# Patient Record
Sex: MALE | Race: OTHER | Employment: OTHER | URBAN - METROPOLITAN AREA
[De-identification: names, ages, dates, MRNs, and addresses within clinical notes are randomized per-mention and may not be internally consistent; named-entity substitution may affect disease eponyms.]

---

## 2017-01-20 ENCOUNTER — NURSE ONLY (OUTPATIENT)
Dept: FAMILY MEDICINE CLINIC | Age: 42
End: 2017-01-20

## 2017-01-20 DIAGNOSIS — Z23 NEED FOR PROPHYLACTIC VACCINATION AND INOCULATION AGAINST VIRAL HEPATITIS: Primary | ICD-10-CM

## 2017-01-20 PROCEDURE — 90471 IMMUNIZATION ADMIN: CPT | Performed by: FAMILY MEDICINE

## 2017-01-20 PROCEDURE — 90746 HEPB VACCINE 3 DOSE ADULT IM: CPT | Performed by: FAMILY MEDICINE

## 2017-03-10 DIAGNOSIS — D47.2 SMOLDERING MULTIPLE MYELOMA: Primary | ICD-10-CM

## 2017-03-15 ENCOUNTER — OFFICE VISIT (OUTPATIENT)
Dept: ONCOLOGY | Age: 42
End: 2017-03-15

## 2017-03-15 VITALS
RESPIRATION RATE: 18 BRPM | TEMPERATURE: 97.1 F | HEIGHT: 63 IN | OXYGEN SATURATION: 98 % | WEIGHT: 166 LBS | BODY MASS INDEX: 29.41 KG/M2 | SYSTOLIC BLOOD PRESSURE: 130 MMHG | DIASTOLIC BLOOD PRESSURE: 81 MMHG | HEART RATE: 71 BPM

## 2017-03-15 DIAGNOSIS — D47.2 SMOLDERING MULTIPLE MYELOMA: Primary | ICD-10-CM

## 2017-03-15 PROCEDURE — G8484 FLU IMMUNIZE NO ADMIN: HCPCS | Performed by: INTERNAL MEDICINE

## 2017-03-15 PROCEDURE — 1036F TOBACCO NON-USER: CPT | Performed by: INTERNAL MEDICINE

## 2017-03-15 PROCEDURE — G8427 DOCREV CUR MEDS BY ELIG CLIN: HCPCS | Performed by: INTERNAL MEDICINE

## 2017-03-15 PROCEDURE — 99214 OFFICE O/P EST MOD 30 MIN: CPT | Performed by: INTERNAL MEDICINE

## 2017-03-15 PROCEDURE — G8419 CALC BMI OUT NRM PARAM NOF/U: HCPCS | Performed by: INTERNAL MEDICINE

## 2017-03-15 ASSESSMENT — ENCOUNTER SYMPTOMS
CHEST TIGHTNESS: 0
COUGH: 0
EYE DISCHARGE: 0
CONSTIPATION: 0
FACIAL SWELLING: 0
TROUBLE SWALLOWING: 0
WHEEZING: 0
NAUSEA: 0
SHORTNESS OF BREATH: 0
VOMITING: 0
COLOR CHANGE: 0
BACK PAIN: 0
SORE THROAT: 0
ABDOMINAL PAIN: 0
ABDOMINAL DISTENTION: 0
BLOOD IN STOOL: 0
DIARRHEA: 0
RECTAL PAIN: 0

## 2017-05-19 ENCOUNTER — OFFICE VISIT (OUTPATIENT)
Dept: FAMILY MEDICINE CLINIC | Age: 42
End: 2017-05-19

## 2017-05-19 VITALS
HEART RATE: 56 BPM | HEIGHT: 63 IN | BODY MASS INDEX: 29.7 KG/M2 | RESPIRATION RATE: 10 BRPM | TEMPERATURE: 98.6 F | WEIGHT: 167.6 LBS | SYSTOLIC BLOOD PRESSURE: 116 MMHG | DIASTOLIC BLOOD PRESSURE: 72 MMHG

## 2017-05-19 DIAGNOSIS — Z11.4 SCREENING FOR HIV WITHOUT PRESENCE OF RISK FACTORS: ICD-10-CM

## 2017-05-19 DIAGNOSIS — Z11.59 NEED FOR HEPATITIS C SCREENING TEST: ICD-10-CM

## 2017-05-19 DIAGNOSIS — G47.9 SLEEP DIFFICULTIES: ICD-10-CM

## 2017-05-19 DIAGNOSIS — Z23 NEED FOR HEPATITIS VACCINATION: Primary | ICD-10-CM

## 2017-05-19 DIAGNOSIS — D47.2 SMOLDERING MULTIPLE MYELOMA: ICD-10-CM

## 2017-05-19 DIAGNOSIS — R53.83 OTHER FATIGUE: ICD-10-CM

## 2017-05-19 PROCEDURE — 90471 IMMUNIZATION ADMIN: CPT | Performed by: FAMILY MEDICINE

## 2017-05-19 PROCEDURE — 90472 IMMUNIZATION ADMIN EACH ADD: CPT | Performed by: FAMILY MEDICINE

## 2017-05-19 PROCEDURE — 90746 HEPB VACCINE 3 DOSE ADULT IM: CPT | Performed by: FAMILY MEDICINE

## 2017-05-19 PROCEDURE — G8427 DOCREV CUR MEDS BY ELIG CLIN: HCPCS | Performed by: FAMILY MEDICINE

## 2017-05-19 PROCEDURE — 90632 HEPA VACCINE ADULT IM: CPT | Performed by: FAMILY MEDICINE

## 2017-05-19 PROCEDURE — 1036F TOBACCO NON-USER: CPT | Performed by: FAMILY MEDICINE

## 2017-05-19 PROCEDURE — G8419 CALC BMI OUT NRM PARAM NOF/U: HCPCS | Performed by: FAMILY MEDICINE

## 2017-05-19 PROCEDURE — 99214 OFFICE O/P EST MOD 30 MIN: CPT | Performed by: FAMILY MEDICINE

## 2017-08-22 ENCOUNTER — HOSPITAL ENCOUNTER (OUTPATIENT)
Age: 42
Discharge: HOME | End: 2017-08-22
Payer: COMMERCIAL

## 2017-08-22 DIAGNOSIS — D47.2 SMOLDERING MULTIPLE MYELOMA: ICD-10-CM

## 2017-08-22 LAB
BASINOPHIL, AUTOMATED: 1 % (ref 0–12)
BUN, WHOLE BLOOD: 11 MG/DL (ref 8–26)
CHLORIDE, WHOLE BLOOD: 104 MEQ/L (ref 98–109)
CREATININE, WHOLE BLOOD: 1.1 MG/DL (ref 0.5–1.2)
EOSINOPHILS RELATIVE PERCENT: 4 % (ref 0–12)
GFR, ESTIMATED: 78 ML/MIN/1.73M2
GLUCOSE, WHOLE BLOOD: 72 MG/DL (ref 70–108)
HCT VFR BLD CALC: 44.6 % (ref 42–52)
HEMOGLOBIN: 14.7 GM/DL (ref 14–18)
IONIZED CALCIUM, WHOLE BLOOD: 1.21 MMOL/L (ref 1.12–1.32)
LYMPHOCYTES # BLD: 43 % (ref 15–47)
MCH RBC QN AUTO: 28.5 PG (ref 27–31)
MCHC RBC AUTO-ENTMCNC: 33 GM/DL (ref 33–37)
MCV RBC AUTO: 86 FL (ref 80–94)
MONOCYTES: 9 % (ref 0–12)
PDW BLD-RTO: 11.3 % (ref 11.5–14.5)
PLATELET # BLD: 230 THOU/MM3 (ref 130–400)
PMV BLD AUTO: 8.5 MCM (ref 7.4–10.4)
POTASSIUM, WHOLE BLOOD: 4.9 MEQ/L (ref 3.5–4.9)
RBC # BLD: 5.17 MILL/MM3 (ref 4.7–6.1)
SEG NEUTROPHILS: 43 % (ref 43–75)
SODIUM, WHOLE BLOOD: 141 MEQ/L (ref 138–146)
TOTAL CO2, WHOLE BLOOD: 28 MEQ/L (ref 23–33)
WBC # BLD: 6.9 THOU/MM3 (ref 4.8–10.8)

## 2017-08-22 PROCEDURE — 36415 COLL VENOUS BLD VENIPUNCTURE: CPT

## 2017-08-22 PROCEDURE — 82784 ASSAY IGA/IGD/IGG/IGM EACH: CPT

## 2017-08-22 PROCEDURE — 80047 BASIC METABLC PNL IONIZED CA: CPT

## 2017-08-22 PROCEDURE — 85025 COMPLETE CBC W/AUTO DIFF WBC: CPT

## 2017-08-22 PROCEDURE — 84160 ASSAY OF PROTEIN ANY SOURCE: CPT

## 2017-08-22 PROCEDURE — 86334 IMMUNOFIX E-PHORESIS SERUM: CPT

## 2017-08-22 PROCEDURE — 84165 PROTEIN E-PHORESIS SERUM: CPT

## 2017-08-25 LAB — IMMUNOFIXATION WITH QUANT: NORMAL

## 2017-09-01 ENCOUNTER — OFFICE VISIT (OUTPATIENT)
Dept: ONCOLOGY | Age: 42
End: 2017-09-01
Payer: COMMERCIAL

## 2017-09-01 ENCOUNTER — HOSPITAL ENCOUNTER (OUTPATIENT)
Dept: INFUSION THERAPY | Age: 42
Discharge: HOME | End: 2017-09-01
Payer: COMMERCIAL

## 2017-09-01 VITALS
SYSTOLIC BLOOD PRESSURE: 118 MMHG | WEIGHT: 168.4 LBS | HEIGHT: 63 IN | DIASTOLIC BLOOD PRESSURE: 74 MMHG | BODY MASS INDEX: 29.84 KG/M2 | TEMPERATURE: 97.4 F | HEART RATE: 69 BPM | RESPIRATION RATE: 18 BRPM | OXYGEN SATURATION: 99 %

## 2017-09-01 DIAGNOSIS — D47.2 SMOLDERING MULTIPLE MYELOMA: Primary | ICD-10-CM

## 2017-09-01 PROCEDURE — G8419 CALC BMI OUT NRM PARAM NOF/U: HCPCS | Performed by: INTERNAL MEDICINE

## 2017-09-01 PROCEDURE — 99214 OFFICE O/P EST MOD 30 MIN: CPT | Performed by: INTERNAL MEDICINE

## 2017-09-01 PROCEDURE — G8427 DOCREV CUR MEDS BY ELIG CLIN: HCPCS | Performed by: INTERNAL MEDICINE

## 2017-09-01 PROCEDURE — 1036F TOBACCO NON-USER: CPT | Performed by: INTERNAL MEDICINE

## 2017-09-01 PROCEDURE — 99211 OFF/OP EST MAY X REQ PHY/QHP: CPT

## 2017-09-01 ASSESSMENT — ENCOUNTER SYMPTOMS
DIARRHEA: 0
CHEST TIGHTNESS: 0
COUGH: 0
WHEEZING: 0
VOMITING: 0
BACK PAIN: 0
BLOOD IN STOOL: 0
NAUSEA: 0
RECTAL PAIN: 0
ABDOMINAL PAIN: 0
TROUBLE SWALLOWING: 0
EYE DISCHARGE: 0
SHORTNESS OF BREATH: 0
CONSTIPATION: 0
COLOR CHANGE: 0
SORE THROAT: 0
FACIAL SWELLING: 0
ABDOMINAL DISTENTION: 0

## 2017-09-08 ENCOUNTER — HOSPITAL ENCOUNTER (OUTPATIENT)
Age: 42
Discharge: HOME | End: 2017-09-08
Payer: COMMERCIAL

## 2017-09-08 ENCOUNTER — HOSPITAL ENCOUNTER (OUTPATIENT)
Dept: GENERAL RADIOLOGY | Age: 42
Discharge: HOME | End: 2017-09-08
Payer: COMMERCIAL

## 2017-09-08 DIAGNOSIS — D47.2 SMOLDERING MULTIPLE MYELOMA: ICD-10-CM

## 2017-09-08 PROCEDURE — 77075 RADEX OSSEOUS SURVEY COMPL: CPT

## 2017-11-17 ENCOUNTER — OFFICE VISIT (OUTPATIENT)
Dept: FAMILY MEDICINE CLINIC | Age: 42
End: 2017-11-17
Payer: COMMERCIAL

## 2017-11-17 VITALS
TEMPERATURE: 98.9 F | DIASTOLIC BLOOD PRESSURE: 70 MMHG | SYSTOLIC BLOOD PRESSURE: 118 MMHG | HEIGHT: 65 IN | WEIGHT: 170 LBS | HEART RATE: 65 BPM | BODY MASS INDEX: 28.32 KG/M2 | OXYGEN SATURATION: 98 %

## 2017-11-17 DIAGNOSIS — Z11.59 NEED FOR HEPATITIS C SCREENING TEST: ICD-10-CM

## 2017-11-17 DIAGNOSIS — Z00.00 WELL ADULT EXAM: Primary | ICD-10-CM

## 2017-11-17 DIAGNOSIS — Z11.4 SCREENING FOR HIV WITHOUT PRESENCE OF RISK FACTORS: ICD-10-CM

## 2017-11-17 DIAGNOSIS — Z23 NEED FOR PROPHYLACTIC VACCINATION AGAINST STREPTOCOCCUS PNEUMONIAE (PNEUMOCOCCUS): ICD-10-CM

## 2017-11-17 LAB
ALBUMIN SERPL-MCNC: 4.5 G/DL (ref 3.5–5.1)
ALP BLD-CCNC: 50 U/L (ref 38–126)
ALT SERPL-CCNC: 27 U/L (ref 11–66)
ANION GAP SERPL CALCULATED.3IONS-SCNC: 11 MEQ/L (ref 8–16)
AST SERPL-CCNC: 24 U/L (ref 5–40)
BACTERIA: NORMAL
BILIRUB SERPL-MCNC: 0.3 MG/DL (ref 0.3–1.2)
BILIRUBIN URINE: NEGATIVE
BLOOD, URINE: NEGATIVE
BUN BLDV-MCNC: 15 MG/DL (ref 7–22)
CALCIUM SERPL-MCNC: 9.1 MG/DL (ref 8.5–10.5)
CASTS: NORMAL /LPF
CASTS: NORMAL /LPF
CHARACTER, URINE: CLEAR
CHLORIDE BLD-SCNC: 101 MEQ/L (ref 98–111)
CHOLESTEROL, TOTAL: 207 MG/DL (ref 100–199)
CO2: 25 MEQ/L (ref 23–33)
COLOR: YELLOW
CREAT SERPL-MCNC: 0.7 MG/DL (ref 0.4–1.2)
CRYSTALS: NORMAL
EPITHELIAL CELLS, UA: NORMAL /HPF
GFR SERPL CREATININE-BSD FRML MDRD: > 90 ML/MIN/1.73M2
GLUCOSE BLD-MCNC: 82 MG/DL (ref 70–108)
GLUCOSE, URINE: NEGATIVE MG/DL
HDLC SERPL-MCNC: 55 MG/DL
HEPATITIS C ANTIBODY: NEGATIVE
KETONES, URINE: NEGATIVE
LDL CHOLESTEROL CALCULATED: 106 MG/DL
LEUKOCYTE ESTERASE, URINE: NEGATIVE
MISCELLANEOUS LAB TEST RESULT: NORMAL
NITRITE, URINE: NEGATIVE
PH UA: 7.5
POTASSIUM SERPL-SCNC: 4.2 MEQ/L (ref 3.5–5.2)
PROTEIN UA: NEGATIVE MG/DL
RBC URINE: NORMAL /HPF
RENAL EPITHELIAL, UA: NORMAL
SODIUM BLD-SCNC: 137 MEQ/L (ref 135–145)
SPECIFIC GRAVITY UA: 1.01 (ref 1–1.03)
TOTAL PROTEIN: 10.2 G/DL (ref 6.1–8)
TRIGL SERPL-MCNC: 231 MG/DL (ref 0–199)
UROBILINOGEN, URINE: 0.2 EU/DL
WBC UA: NORMAL /HPF
YEAST: NORMAL

## 2017-11-17 PROCEDURE — 99396 PREV VISIT EST AGE 40-64: CPT | Performed by: FAMILY MEDICINE

## 2017-11-17 PROCEDURE — 36415 COLL VENOUS BLD VENIPUNCTURE: CPT | Performed by: FAMILY MEDICINE

## 2017-11-17 PROCEDURE — 90732 PPSV23 VACC 2 YRS+ SUBQ/IM: CPT | Performed by: FAMILY MEDICINE

## 2017-11-17 PROCEDURE — 90471 IMMUNIZATION ADMIN: CPT | Performed by: FAMILY MEDICINE

## 2017-11-17 ASSESSMENT — PATIENT HEALTH QUESTIONNAIRE - PHQ9
SUM OF ALL RESPONSES TO PHQ9 QUESTIONS 1 & 2: 0
1. LITTLE INTEREST OR PLEASURE IN DOING THINGS: 0
2. FEELING DOWN, DEPRESSED OR HOPELESS: 0
SUM OF ALL RESPONSES TO PHQ QUESTIONS 1-9: 0

## 2017-11-17 NOTE — PATIENT INSTRUCTIONS
You may receive a survey about your visit with us today. The feedback from our patients helps us identify what is working well and where the service to all patients can be enhanced. Thank you! Patient Education        Garcia Piper neumocócica polisacárida: Lo que necesita saber  ¿Por qué es necesario vacunarse? La vacunación puede proteger a los [place] (y a algunos niños y adultos más jóvenes) contra la enfermedad neumocócica. La enfermedad neumocócica es causada por bacterias que pueden propagarse de [place] persona a otra mediante el contacto cercano. Puede provocar infecciones en los oídos y puede provocar también infecciones más graves en:  · Los pulmones (neumonía). · La shama (bacteriemia). · El recubrimiento del cerebro y de la médula phillips (meningitis). La meningitis puede provocar sordera y daño cerebral, y puede ser mortal.  Todas las personas pueden contraer la enfermedad neumocócica, [employer] niños menores de 2 años, las personas con determinadas afecciones médicas, los adultos mayores de 72 años y los fumadores de cigarrillos tienen el riesgo más alto. En los [employer], aproximadamente 18,000 adultos [employer] mueren cada año a causa de la enfermedad neumocócica. El tratamiento de las infecciones neumocócicas con penicilina y otros fármacos solía ser Arliss Baseman. Sin embargo, algunas cepas de la enfermedad se bonilla vuelto resistentes a estos fármacos. [place] hace que la prevención de la enfermedad, a través de la vacunación, sea aún más importante. Vacuna neumocócica polisacárida (PPSV23)  La vacuna neumocócica polisacárida (pneumococcal polysaccharide vaccine, PPSV23) reba protección contra 23 tipos de bacterias neumocócicas. Alexandro no previene todas las enfermedades neumocócicas. La PPSV23 se recomienda para las siguientes personas:  · [place] [employer] de 72 años o [place]. · Cualquier persona de 2 a 59 años que tenga determinados problemas de dennis a nani plazo.   · Cualquier persona de 2 a los cuales desaparecen en el término de, aproximadamente, [employer]. Menos de 1 de cada 100 personas presenta fiebre, kailash musculares o reacciones locales [employer]. Problemas que pueden producirse después de la aplicación de cualquier vacuna:  · En algunos casos, las personas se [employer] después de un procedimiento médico, incluida la vacunación. Sentarse o [employer], aproximadamente, 15 minutos puede ayudar a [employer], y las lesiones causadas por jake caída. Informe a starr médico si se siente mareado o si tiene cambios en la visión o zumbido [facility]. · Algunas personas sienten un dolor severo en el hombro y tienen dificultad para  el brazo en el cual se aplicó la inyección. Nibbe ocurre con muy poca frecuencia. · Cualquier medicamento puede provocar jake reacción alérgica severa. Tales reacciones a Gareth Dae son [place] frecuentes: se estima que se presentan, aproximadamente, en 1 de cada millón de dosis y se producen desde algunos minutos hasta algunas horas después de la vacunación. Al igual que con cualquier [employer], hay jake probabilidad muy remota de que jake vacuna cause jake lesión grave o la [place]. La seguridad de las vacunas se monitorea constantemente. Para obtener más información, visite: www.cdc.gov/vaccinesafety/.  ¿Qué hago si [place] reacción grave? ¿A qué ynes prestar atención? Debe prestar atención a todo lo que [place], shree signos de jake reacción alérgica severa, fiebre muy nikole o un comportamiento inusual.  Los signos de jake reacción alérgica severa pueden incluir urticaria, hinchazón de la duc y la garganta, dificultad para respirar, pulso acelerado, mareos y debilidad. Por lo general, estos podrían comenzar entre algunos minutos y algunas horas después de la vacunación. ¿Qué ynes hacer? Si zion que se trata de jake reacción alérgica severa u otra emergencia que no puede esperar, llame al 9-1-1 o diríjase al [facility].  De lo contrario, llame a starr médico.  Luego, la reacción se debe reportar al [employer] de reporte de eventos adversos derivados de las vacunas (Vaccine Adverse Event Reporting System, VAERS). Starr médico puede presentar doris reporte o puede hacerlo usted mismo a través del sitio web del VAERS en www.vaers. [facility].gov o llamando al [phone]. El VAERS no proporciona asesoramiento médico.  ¿Dónde puedo obtener más información? · Pregúntele a starr médico. El médico puede darle el folleto informativo de la vacuna o sugerirle otras romero de [place]. · Llame a starr departamento de dennis local o estatal.  · Comuníquese con los Centros para el Control y la Prevención de Enfermedades (Centers for Disease Control and Prevention, CDC):  Liset morales [phone] (8-327-OTQ-INFO), o  ¨ Visite el sitio web de Chirag & Osmar en www.cdc.gov/vaccines  Vaccine Information Statement  PPSV Vaccine  [employer]  (4/24/2015)  Department of Health and Human Services  Centers for Disease Control and Prevention  Translation provided by the Immunization Action Coalition  Muchas hojas de información sobre vacunas están disponibles en español y en otros idiomas. Visite www.immunize.org/vis. Las instrucciones de cuidado fueron adaptadas bajo licencia por [facility] ([facility]). Si usted tiene [place] afección médica o sobre estas instrucciones, siempre pregunte a starr profesional de dennis. [facility] niega toda garantía o responsabilidad por starr uso de esta información.

## 2017-11-17 NOTE — PROGRESS NOTES
Venipuncture obtained from left arm. Patient tolerated the procedure without complication or complaint.

## 2017-11-17 NOTE — PROGRESS NOTES
Annual Exam      Subjective:    Romana Confer is a 43y.o. year old male. He is here for his annual wellness exam.     HPI:    With regard to his health habits he states he  eats 3 meals per day and 2 snacks per day. Breakfast is eggs, ham and cheese and V8. Mid morning he eats an oatmeal.  Lunch he is eating 1/2 cup of rice with beans and meat and vegetables. Dinner normally is a smaller meal, but still a protein and salad. He does exercise regularly:  His physical activities include: biking, 4 times per week, 60 minutes/day. He does not take over the counter vitamins. He never had a blood transfusion or tattoo. He wears seatbelts when driving a car. He does not talk or text on the phone while driving. He works 50 hours a week. He is content with his life. He is . 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 He is due for Pneumococcal 23. He is not a smoker. He does not consume alcoholic beverages on a regular basis. He has not had his screening colonoscopy. He is sexually active. He has had 1 partner(s) in the last 14 years. He does not perform regular testicular self exams. Review of Systems:     General ROS: negative for - chills, fatigue, fever, hot flashes, malaise, night sweats, sleep disturbance, weight gain or weight loss. Psychological ROS: negative for - anxiety, concentration difficulties, depression, irritability, memory difficulties, mood swings, sleep disturbances or suicidal ideation. ENT ROS: negative for - headaches, nasal congestion, oral lesions, sore throat or visual changes. Hematological and Lymphatic ROS: negative for - blood clots, bruising or swollen lymph nodes. Endocrine ROS: negative for - palpitations, polydipsia/polyuria, skin changes or temperature intolerance. Respiratory ROS: negative for - cough, hemoptysis, shortness of breath or wheezing.    Cardiovascular ROS: negative for - chest pain, dyspnea on exertion, edema, loss of consciousness, palpitations or shortness of breath. Gastrointestinal ROS: negative for - abdominal pain, blood in stools, change in bowel habits, constipation, diarrhea, heartburn, hematemesis, melena or nausea/vomiting. Genito-Urinary ROS: negative for - change in urinary stream, dysuria, erectile dysfunction or hematuria. Musculoskeletal ROS: negative for - gait disturbance, joint stiffness, joint swelling or muscle pain. Neurological ROS: negative for - dizziness, numbness/tingling, seizures or tremors. Dermatological ROS: negative for - mole changes or rashes. 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    Multiple Myeloma        Past Surgical History:   Procedure Laterality Date    BONE MARROW BIOPSY  09/23/2016       Family History   Problem Relation Age of Onset    High Blood Pressure Mother     Other Mother      osteoporosis    Asthma Father     High Blood Pressure Father        Social History     Social History    Marital status:      Spouse name: Mikal Taylor Number of children: 2    Years of education: 12     Occupational History    Dental [facility]     Social History Main Topics    Smoking status: Former Smoker     Packs/day: 0.50     Years: 10.00     Types: Cigarettes     Quit date: 1/1/2008    Smokeless tobacco: Never Used    Alcohol use Yes      Comment: occasionally    Drug use: No    Sexual activity: Yes     Partners: Female     Other Topics Concern    Not on file     Social History Narrative    No narrative on file       No Known Allergies    currently has no medications in their medication list.    Objective:    Blood pressure 118/70, pulse 65, temperature 98.9 °F (37.2 °C), temperature source Oral, height 5' 4.57\" (1.64 m), weight 170 lb (77.1 kg), SpO2 98 %.     Physical Examination:     General appearance - alert, well appearing, and in no distress

## 2017-11-20 LAB — HIV-2 AB: NEGATIVE

## 2017-11-22 ENCOUNTER — TELEPHONE (OUTPATIENT)
Dept: FAMILY MEDICINE CLINIC | Age: 42
End: 2017-11-22

## 2017-11-22 NOTE — TELEPHONE ENCOUNTER
----- Message from Ary Peña MD sent at 11/21/2017  7:05 PM EST -----  Blood test within acceptable ranges except for the protein that we already know and the triglycerides that are elevated.   Recommend low carbohydrate diet and avoid fruit juices

## 2018-02-01 ENCOUNTER — HOSPITAL ENCOUNTER (OUTPATIENT)
Age: 43
Discharge: HOME | End: 2018-02-01
Payer: COMMERCIAL

## 2018-02-01 DIAGNOSIS — D47.2 SMOLDERING MULTIPLE MYELOMA: ICD-10-CM

## 2018-02-01 LAB
BASINOPHIL, AUTOMATED: 1 % (ref 0–12)
CALCIUM SERPL-MCNC: 9.4 MG/DL (ref 8.5–10.5)
EOSINOPHILS RELATIVE PERCENT: 4 % (ref 0–12)
HCT VFR BLD CALC: 41.9 % (ref 42–52)
HEMOGLOBIN: 13.9 GM/DL (ref 14–18)
LYMPHOCYTES # BLD: 40 % (ref 15–47)
MCH RBC QN AUTO: 29 PG (ref 27–31)
MCHC RBC AUTO-ENTMCNC: 33.2 GM/DL (ref 33–37)
MCV RBC AUTO: 87 FL (ref 80–94)
MONOCYTES: 9 % (ref 0–12)
PDW BLD-RTO: 11.4 % (ref 11.5–14.5)
PLATELET # BLD: 198 THOU/MM3 (ref 130–400)
PMV BLD AUTO: 8.5 FL (ref 7.4–10.4)
RBC # BLD: 4.79 MILL/MM3 (ref 4.7–6.1)
SEG NEUTROPHILS: 47 % (ref 43–75)
WBC # BLD: 5.8 THOU/MM3 (ref 4.8–10.8)

## 2018-02-01 PROCEDURE — 85025 COMPLETE CBC W/AUTO DIFF WBC: CPT

## 2018-02-01 PROCEDURE — 84165 PROTEIN E-PHORESIS SERUM: CPT

## 2018-02-01 PROCEDURE — 82310 ASSAY OF CALCIUM: CPT

## 2018-02-01 PROCEDURE — 86334 IMMUNOFIX E-PHORESIS SERUM: CPT

## 2018-02-01 PROCEDURE — 36415 COLL VENOUS BLD VENIPUNCTURE: CPT

## 2018-02-01 PROCEDURE — 83883 ASSAY NEPHELOMETRY NOT SPEC: CPT

## 2018-02-01 PROCEDURE — 84160 ASSAY OF PROTEIN ANY SOURCE: CPT

## 2018-02-01 PROCEDURE — 82784 ASSAY IGA/IGD/IGG/IGM EACH: CPT

## 2018-02-03 LAB
IMMUNOFIXATION WITH QUANT: NORMAL
KAPPA/LAMBDA FREE LIGHT CHAINS: NORMAL

## 2018-02-08 ENCOUNTER — HOSPITAL ENCOUNTER (OUTPATIENT)
Dept: INFUSION THERAPY | Age: 43
Discharge: HOME | End: 2018-02-08
Payer: COMMERCIAL

## 2018-02-08 ENCOUNTER — OFFICE VISIT (OUTPATIENT)
Dept: ONCOLOGY | Age: 43
End: 2018-02-08
Payer: COMMERCIAL

## 2018-02-08 VITALS
HEIGHT: 65 IN | OXYGEN SATURATION: 99 % | RESPIRATION RATE: 16 BRPM | HEART RATE: 82 BPM | BODY MASS INDEX: 28.52 KG/M2 | WEIGHT: 171.2 LBS | SYSTOLIC BLOOD PRESSURE: 123 MMHG | TEMPERATURE: 97.9 F | DIASTOLIC BLOOD PRESSURE: 83 MMHG

## 2018-02-08 DIAGNOSIS — D47.2 SMOLDERING MULTIPLE MYELOMA: Primary | ICD-10-CM

## 2018-02-08 PROCEDURE — 99211 OFF/OP EST MAY X REQ PHY/QHP: CPT

## 2018-02-08 PROCEDURE — G8427 DOCREV CUR MEDS BY ELIG CLIN: HCPCS | Performed by: INTERNAL MEDICINE

## 2018-02-08 PROCEDURE — G8419 CALC BMI OUT NRM PARAM NOF/U: HCPCS | Performed by: INTERNAL MEDICINE

## 2018-02-08 PROCEDURE — 99214 OFFICE O/P EST MOD 30 MIN: CPT | Performed by: INTERNAL MEDICINE

## 2018-02-08 PROCEDURE — 1036F TOBACCO NON-USER: CPT | Performed by: INTERNAL MEDICINE

## 2018-02-08 PROCEDURE — G8482 FLU IMMUNIZE ORDER/ADMIN: HCPCS | Performed by: INTERNAL MEDICINE

## 2018-02-08 ASSESSMENT — ENCOUNTER SYMPTOMS
DIARRHEA: 0
COUGH: 0
EYE DISCHARGE: 0
WHEEZING: 0
VOMITING: 0
BLOOD IN STOOL: 0
BACK PAIN: 0
SORE THROAT: 0
CHEST TIGHTNESS: 0
SHORTNESS OF BREATH: 0
CONSTIPATION: 0
FACIAL SWELLING: 0
ABDOMINAL PAIN: 0
RECTAL PAIN: 0
NAUSEA: 0
COLOR CHANGE: 0
ABDOMINAL DISTENTION: 0
TROUBLE SWALLOWING: 0

## 2018-02-08 NOTE — PROGRESS NOTES
[facility] PROFESSIONAL SERVS  [facility]  [address] [facility] [address]  [address]  [address] [address]  Dept: [phone]  Dept Fax: [phone]: [phone]     Visit Date: 2/8/2018     Joan Keen is a 43 y.o. male who presents today for:   Chief Complaint   Patient presents with   Antonette ACUÑA     Maria T Mitchell is a 39 y.o. Male with smoldering multiple myeloma. When he applied for life insurance he was denied because his total protein and globulin were elevated on screening lab work. Therefore he underwent further testing in the June 2016. The immunofixation electrophoresis showed M-spike in the gamma region. The monoclonal protein peak accounted for 3.17 g/dL of the total 3.34 g/dL of protein in  the gamma region.  PAU pattern showed an IgG type kappa monoclonal protein. The patient had a bone marrow biopsy, cytogenetic analysis and bone survey. Bone marrow biopsy showed increased mature plasma cells (17%). No suspicious osseous lesions were identified on the bone survey. His labs met criteria for SMM ( an M-protein ?3 g/dL, 17% bone marrow plasma cells and no end-organ damage). The patient is currently followed by surveillance, no active treatment    Interim history on February 8, 2018: Today, he presents for  6 months follow-up visit. Patient remains asymptomatic, he denies having any B symptoms, no bone pain, no peripheral neuropathy. He denies having any fevers, no recent infections. He continues to maintain weight with diet and exercise. He denies being placed on any new medications since last visit.   No visits to the emergency room, no admissions to the hospital.    Family History   Problem Relation Age of Onset    High Blood Pressure Mother     Other Mother      osteoporosis    Asthma Father     High Blood Pressure Father       Social History   Substance Use Topics    Smoking status: Former Smoker     Packs/day: 0.50     Years: 10.00     Types: Head: Normocephalic. Mouth/Throat: Oropharynx is clear and moist. No oropharyngeal exudate. Eyes: EOM are normal. Pupils are equal, round, and reactive to light. Right eye exhibits no discharge. Left eye exhibits no discharge. No scleral icterus. Neck: Normal range of motion. Neck supple. No JVD present. No tracheal deviation present. No thyromegaly present. Cardiovascular: Normal rate and normal heart sounds. Exam reveals no gallop and no friction rub. No murmur heard. Pulmonary/Chest: Effort normal and breath sounds normal. No stridor. No respiratory distress. He has no wheezes. He has no rales. He exhibits no tenderness. Abdominal: Soft. Bowel sounds are normal. He exhibits no distension and no mass. There is no tenderness. There is no rebound. Musculoskeletal: Normal range of motion. He exhibits no edema. Lymphadenopathy:     He has no cervical adenopathy. Neurological: He is alert and oriented to person, place, and time. He has normal reflexes. No cranial nerve deficit. He exhibits normal muscle tone. Skin: Skin is warm. No rash noted. No erythema. Psychiatric: He has a normal mood and affect. His behavior is normal. Judgment and thought content normal.   Vitals reviewed. /83 (Site: Left Arm, Position: Sitting, Cuff Size: Medium Adult)   Pulse 82   Temp 97.9 °F (36.6 °C) (Oral)   Resp 16   Ht 5' 4.57\" (1.64 m)   Wt 171 lb 3.2 oz (77.7 kg)   SpO2 99%   BMI 28.87 kg/m²      Imaging studies and labs:   Bone survey on 09/25/2016 showed:  1. No suspicious osseous lesions are identified on the bone survey.     Lab Results   Component Value Date    WBC 5.8 02/01/2018    HGB 13.9 (L) 02/01/2018    HCT 41.9 (L) 02/01/2018    MCV 87 02/01/2018     02/01/2018       Chemistry        Component Value Date/Time     11/17/2017 1045    K 4.2 11/17/2017 1045     11/17/2017 1045    CO2 25 11/17/2017 1045    BUN 15 11/17/2017 1045    CREATININE 0.7 11/17/2017 1045

## 2018-05-18 ENCOUNTER — OFFICE VISIT (OUTPATIENT)
Dept: FAMILY MEDICINE CLINIC | Age: 43
End: 2018-05-18
Payer: COMMERCIAL

## 2018-05-18 VITALS
BODY MASS INDEX: 29.37 KG/M2 | WEIGHT: 172 LBS | TEMPERATURE: 98.2 F | RESPIRATION RATE: 10 BRPM | HEIGHT: 64 IN | OXYGEN SATURATION: 98 % | DIASTOLIC BLOOD PRESSURE: 62 MMHG | HEART RATE: 69 BPM | SYSTOLIC BLOOD PRESSURE: 108 MMHG

## 2018-05-18 DIAGNOSIS — D47.2 SMOLDERING MULTIPLE MYELOMA: Primary | ICD-10-CM

## 2018-05-18 PROCEDURE — G8427 DOCREV CUR MEDS BY ELIG CLIN: HCPCS | Performed by: FAMILY MEDICINE

## 2018-05-18 PROCEDURE — G8419 CALC BMI OUT NRM PARAM NOF/U: HCPCS | Performed by: FAMILY MEDICINE

## 2018-05-18 PROCEDURE — 1036F TOBACCO NON-USER: CPT | Performed by: FAMILY MEDICINE

## 2018-05-18 PROCEDURE — 99213 OFFICE O/P EST LOW 20 MIN: CPT | Performed by: FAMILY MEDICINE

## 2018-08-02 ENCOUNTER — HOSPITAL ENCOUNTER (OUTPATIENT)
Age: 43
Discharge: HOME | End: 2018-08-02
Payer: COMMERCIAL

## 2018-08-02 DIAGNOSIS — D47.2 SMOLDERING MULTIPLE MYELOMA: ICD-10-CM

## 2018-08-02 LAB
BASINOPHIL, AUTOMATED: 0 % (ref 0–3)
BUN, WHOLE BLOOD: 16 MG/DL (ref 8–26)
CHLORIDE, WHOLE BLOOD: 103 MEQ/L (ref 98–109)
CREATININE, WHOLE BLOOD: 1 MG/DL (ref 0.5–1.2)
EOSINOPHILS RELATIVE PERCENT: 4 % (ref 0–4)
GFR, ESTIMATED: 87 ML/MIN/1.73M2
GLUCOSE, WHOLE BLOOD: 93 MG/DL (ref 70–108)
HCT VFR BLD CALC: 42.9 % (ref 42–52)
HEMOGLOBIN: 14.2 GM/DL (ref 14–18)
IONIZED CALCIUM, WHOLE BLOOD: 1.24 MMOL/L (ref 1.12–1.32)
LYMPHOCYTES # BLD: 38 % (ref 15–47)
MCH RBC QN AUTO: 28 PG (ref 27–31)
MCHC RBC AUTO-ENTMCNC: 33.1 GM/DL (ref 33–37)
MCV RBC AUTO: 84 FL (ref 80–94)
MONOCYTES: 11 % (ref 0–12)
PDW BLD-RTO: 11.6 % (ref 11.5–14.5)
PLATELET # BLD: 216 THOU/MM3 (ref 130–400)
PMV BLD AUTO: 8.2 FL (ref 7.4–10.4)
POTASSIUM, WHOLE BLOOD: 4.6 MEQ/L (ref 3.5–4.9)
RBC # BLD: 5.08 MILL/MM3 (ref 4.7–6.1)
SEG NEUTROPHILS: 47 % (ref 43–75)
SODIUM, WHOLE BLOOD: 141 MEQ/L (ref 138–146)
TOTAL CO2, WHOLE BLOOD: 26 MEQ/L (ref 23–33)
WBC # BLD: 5.9 THOU/MM3 (ref 4.8–10.8)

## 2018-08-02 PROCEDURE — 83883 ASSAY NEPHELOMETRY NOT SPEC: CPT

## 2018-08-02 PROCEDURE — 80047 BASIC METABLC PNL IONIZED CA: CPT

## 2018-08-02 PROCEDURE — 85025 COMPLETE CBC W/AUTO DIFF WBC: CPT

## 2018-08-02 PROCEDURE — 84165 PROTEIN E-PHORESIS SERUM: CPT

## 2018-08-02 PROCEDURE — 36415 COLL VENOUS BLD VENIPUNCTURE: CPT

## 2018-08-02 PROCEDURE — 84160 ASSAY OF PROTEIN ANY SOURCE: CPT

## 2018-08-04 LAB — KAPPA/LAMBDA FREE LIGHT CHAINS: NORMAL

## 2018-08-05 LAB — PROTEIN ELECTROPHORESIS, SERUM: NORMAL

## 2018-08-08 ENCOUNTER — OFFICE VISIT (OUTPATIENT)
Dept: ONCOLOGY | Age: 43
End: 2018-08-08
Payer: COMMERCIAL

## 2018-08-08 ENCOUNTER — HOSPITAL ENCOUNTER (OUTPATIENT)
Dept: INFUSION THERAPY | Age: 43
Discharge: HOME | End: 2018-08-08
Payer: COMMERCIAL

## 2018-08-08 VITALS
BODY MASS INDEX: 30.15 KG/M2 | WEIGHT: 176.6 LBS | DIASTOLIC BLOOD PRESSURE: 78 MMHG | TEMPERATURE: 98.5 F | RESPIRATION RATE: 18 BRPM | HEIGHT: 64 IN | OXYGEN SATURATION: 99 % | HEART RATE: 78 BPM | SYSTOLIC BLOOD PRESSURE: 119 MMHG

## 2018-08-08 DIAGNOSIS — D47.2 SMOLDERING MULTIPLE MYELOMA: Primary | ICD-10-CM

## 2018-08-08 PROCEDURE — G8427 DOCREV CUR MEDS BY ELIG CLIN: HCPCS | Performed by: INTERNAL MEDICINE

## 2018-08-08 PROCEDURE — 99214 OFFICE O/P EST MOD 30 MIN: CPT | Performed by: INTERNAL MEDICINE

## 2018-08-08 PROCEDURE — G8417 CALC BMI ABV UP PARAM F/U: HCPCS | Performed by: INTERNAL MEDICINE

## 2018-08-08 PROCEDURE — 1036F TOBACCO NON-USER: CPT | Performed by: INTERNAL MEDICINE

## 2018-08-08 PROCEDURE — 99211 OFF/OP EST MAY X REQ PHY/QHP: CPT

## 2018-08-08 ASSESSMENT — ENCOUNTER SYMPTOMS
ABDOMINAL PAIN: 0
COUGH: 0
SORE THROAT: 0
VOMITING: 0
SHORTNESS OF BREATH: 0
FACIAL SWELLING: 0
BLOOD IN STOOL: 0
COLOR CHANGE: 0
TROUBLE SWALLOWING: 0
CHEST TIGHTNESS: 0
RECTAL PAIN: 0
ABDOMINAL DISTENTION: 0
BACK PAIN: 0
EYE DISCHARGE: 0
WHEEZING: 0
CONSTIPATION: 0
DIARRHEA: 0
NAUSEA: 0

## 2018-08-08 NOTE — PATIENT INSTRUCTIONS
1.  RTC in 6 months  2.   Few days before RTC labs: CBC, BMP, calcium,  serum immunoelectrophoresis, kappa lambda light chains

## 2018-08-08 NOTE — PROGRESS NOTES
Head: Normocephalic. Mouth/Throat: Oropharynx is clear and moist. No oropharyngeal exudate. Eyes: EOM are normal. Pupils are equal, round, and reactive to light. Right eye exhibits no discharge. Left eye exhibits no discharge. No scleral icterus. Neck: Normal range of motion. Neck supple. No JVD present. No tracheal deviation present. No thyromegaly present. Cardiovascular: Normal rate and normal heart sounds. Exam reveals no gallop and no friction rub. No murmur heard. Pulmonary/Chest: Effort normal and breath sounds normal. No stridor. No respiratory distress. He has no wheezes. He has no rales. He exhibits no tenderness. Abdominal: Soft. Bowel sounds are normal. He exhibits no distension and no mass. There is no tenderness. There is no rebound. Musculoskeletal: Normal range of motion. He exhibits no edema. Lymphadenopathy:     He has no cervical adenopathy. Neurological: He is alert and oriented to person, place, and time. He has normal reflexes. No cranial nerve deficit. He exhibits normal muscle tone. Skin: Skin is warm. No rash noted. No erythema. Psychiatric: He has a normal mood and affect. His behavior is normal. Judgment and thought content normal.   Vitals reviewed. /78 (Site: Left Arm, Position: Sitting, Cuff Size: Medium Adult)   Pulse 78   Temp 98.5 °F (36.9 °C) (Oral)   Resp 18   Ht 5' 3.5\" (1.613 m)   Wt 176 lb 9.6 oz (80.1 kg)   SpO2 99%   BMI 30.79 kg/m²      Imaging studies and labs:   Bone survey on 09/25/2016 showed:  1. No suspicious osseous lesions are identified on the bone survey.     Lab Results   Component Value Date    WBC 5.9 08/02/2018    HGB 14.2 08/02/2018    HCT 42.9 08/02/2018    MCV 84 08/02/2018     08/02/2018       Chemistry        Component Value Date/Time     08/02/2018 0820     11/17/2017 1045    K 4.6 08/02/2018 0820    K 4.2 11/17/2017 1045     11/17/2017 1045    CO2 25 11/17/2017 1045    BUN 15 11/17/2017               4230 H   [id number]      mg/dL  The monoclonal protein peak  accounts for 3.33 g/dL of the total 3.49 g/dL of protein in the gamma region. Immunofixation on 08/02/2018 showed:  Gamma                                      3.38 H   0.62-1.51     g/dL   M-spike in the gamma region.  The monoclonal protein peak   accounts for 3.22 g/dL of the total 3.38 g/dL of protein in   the gamma region.  Suggest PAU to identify the monoclonal   protein.      06/22/2016:  Crary Qnt Free Light Chains                   8.03 H   0.33-1.94     mg/dL   Lambda Qnt Free Light Chains                  0.77     0.57-2.63     mg/dL   Kappa/Lambda Free Light Chain Ratio          10.43 H   0.26-1.65    12/05/2016:  Kappa Qnt Free Light Chains                   9.99 H   0.33-1.94     mg/dL   Lambda Qnt Free Light Chains                  0.85     0.57-2.63     mg/dL   Kappa/Lambda Free Light Chain Ratio          11.75 H   0.26-1.65    02/01/2018:  Kappa Qnt Free Light Chains                   9.66 H   0.33-1.94     mg/dL   Lambda Qnt Free Light Chains                  0.73     0.57-2.63     mg/dL   Kappa/Lambda Free Light Chain Ratio          13.23 H   0.26-1.65     08/02/2018:  Crary Qnt Free Light Chains                   8.90 H   0.33-1.94     mg/dL   Lambda Qnt Free Light Chains                  0.65     0.57-2.63     mg/dL   Kappa/Lambda Free Light Chain Ratio          13.69 H   0.26-1.65   Bone marrow biopsy on 09/23/2016 showed:      Normocellular bone marrow (60%) with orderly trilineage      hematopoiesis and increased mature plasma cells (17%).     Kappa restricted plasma cells were demonstrated by flow cytometric      analysis. Morphologic and ancillary studies are consistent with a plasma cell      neoplasm (dyscrasia). Assessment:        Diagnosis Orders   1. Smoldering multiple myeloma ([facility] [address]          Plan:   1.  Smoldering Multiple Myeloma (SMM):  This is an 42-year-old male with smoldering multiple myeloma diagnosed

## 2018-09-20 ENCOUNTER — OFFICE VISIT (OUTPATIENT)
Dept: FAMILY MEDICINE CLINIC | Age: 43
End: 2018-09-20
Payer: COMMERCIAL

## 2018-09-20 VITALS
RESPIRATION RATE: 14 BRPM | TEMPERATURE: 98 F | HEIGHT: 64 IN | BODY MASS INDEX: 30.05 KG/M2 | SYSTOLIC BLOOD PRESSURE: 122 MMHG | HEART RATE: 87 BPM | DIASTOLIC BLOOD PRESSURE: 83 MMHG | WEIGHT: 176 LBS

## 2018-09-20 DIAGNOSIS — R52 GENERALIZED BODY ACHES: ICD-10-CM

## 2018-09-20 DIAGNOSIS — Z23 NEEDS FLU SHOT: ICD-10-CM

## 2018-09-20 DIAGNOSIS — R51.9 NONINTRACTABLE HEADACHE, UNSPECIFIED CHRONICITY PATTERN, UNSPECIFIED HEADACHE TYPE: ICD-10-CM

## 2018-09-20 DIAGNOSIS — R05.9 COUGH IN ADULT: ICD-10-CM

## 2018-09-20 DIAGNOSIS — J02.9 SORETHROAT: ICD-10-CM

## 2018-09-20 DIAGNOSIS — R09.81 CONGESTION OF NASAL SINUS: ICD-10-CM

## 2018-09-20 DIAGNOSIS — J06.9 VIRAL URI: Primary | ICD-10-CM

## 2018-09-20 LAB
INFLUENZA A ANTIBODY: NEGATIVE
INFLUENZA B ANTIBODY: NEGATIVE

## 2018-09-20 PROCEDURE — G8427 DOCREV CUR MEDS BY ELIG CLIN: HCPCS | Performed by: NURSE PRACTITIONER

## 2018-09-20 PROCEDURE — 87804 INFLUENZA ASSAY W/OPTIC: CPT | Performed by: NURSE PRACTITIONER

## 2018-09-20 PROCEDURE — 99213 OFFICE O/P EST LOW 20 MIN: CPT | Performed by: NURSE PRACTITIONER

## 2018-09-20 PROCEDURE — G8417 CALC BMI ABV UP PARAM F/U: HCPCS | Performed by: NURSE PRACTITIONER

## 2018-09-20 PROCEDURE — 1036F TOBACCO NON-USER: CPT | Performed by: NURSE PRACTITIONER

## 2018-09-20 PROCEDURE — 90471 IMMUNIZATION ADMIN: CPT | Performed by: NURSE PRACTITIONER

## 2018-09-20 PROCEDURE — 90688 IIV4 VACCINE SPLT 0.5 ML IM: CPT | Performed by: NURSE PRACTITIONER

## 2018-09-20 RX ORDER — BENZONATATE 100 MG/1
100 CAPSULE ORAL 3 TIMES DAILY PRN
Qty: 30 CAPSULE | Refills: 0 | Status: SHIPPED | OUTPATIENT
Start: 2018-09-20 | End: 2018-09-27

## 2018-09-20 RX ORDER — GUAIFENESIN 400 MG/1
400 TABLET ORAL 4 TIMES DAILY PRN
COMMUNITY
End: 2018-12-07 | Stop reason: CLARIF

## 2018-09-20 RX ORDER — GUAIFENESIN 600 MG/1
1200 TABLET, EXTENDED RELEASE ORAL 2 TIMES DAILY
Qty: 40 TABLET | Refills: 0 | Status: SHIPPED | OUTPATIENT
Start: 2018-09-20 | End: 2018-12-07 | Stop reason: CLARIF

## 2018-09-20 ASSESSMENT — ENCOUNTER SYMPTOMS
NAUSEA: 0
RHINORRHEA: 1
SORE THROAT: 1
DIARRHEA: 0
CONSTIPATION: 0
COUGH: 1
SHORTNESS OF BREATH: 1
RECTAL PAIN: 0

## 2018-09-20 ASSESSMENT — PATIENT HEALTH QUESTIONNAIRE - PHQ9
SUM OF ALL RESPONSES TO PHQ9 QUESTIONS 1 & 2: 0
SUM OF ALL RESPONSES TO PHQ QUESTIONS 1-9: 0
1. LITTLE INTEREST OR PLEASURE IN DOING THINGS: 0
SUM OF ALL RESPONSES TO PHQ QUESTIONS 1-9: 0
2. FEELING DOWN, DEPRESSED OR HOPELESS: 0

## 2018-09-20 NOTE — PROGRESS NOTES
Pulse: 87   Resp: 14   Temp: 98 °F (36.7 °C)   TempSrc: Oral   Weight: 176 lb (79.8 kg)   Height: 5' 4\" (1.626 m)     Estimated body mass index is 30.21 kg/m² as calculated from the following:    Height as of this encounter: 5' 4\" (1.626 m). Weight as of this encounter: 176 lb (79.8 kg). Physical Exam   Constitutional: He is oriented to person, place, and time. Vital signs are normal. He appears well-developed and well-nourished. Non-toxic appearance. He does not have a sickly appearance. No distress. HENT:   Right Ear: Tympanic membrane, external ear and ear canal normal.   Left Ear: Tympanic membrane, external ear and ear canal normal.   Nose: Nose normal.   Mouth/Throat: Uvula is midline and mucous membranes are normal. No lacerations. Posterior oropharyngeal erythema present. No oropharyngeal exudate. Left TM cloudy   Cardiovascular: Normal rate, regular rhythm, S1 normal, S2 normal, normal heart sounds and normal pulses. Pulmonary/Chest: Breath sounds normal. No accessory muscle usage. No respiratory distress. Abdominal: Normal appearance. Lymphadenopathy:     He has no cervical adenopathy. Neurological: He is alert and oriented to person, place, and time. Skin: Skin is warm, dry and intact. He is not diaphoretic. Psychiatric: He has a normal mood and affect. His speech is normal and behavior is normal. Judgment and thought content normal.   Vitals reviewed. ASSESSMENT/PLAN:  1. Viral URI  - guaiFENesin (MUCINEX) 600 MG extended release tablet; Take 2 tablets by mouth 2 times daily  Dispense: 40 tablet; Refill: 0    2. Cough in adult  - guaiFENesin (MUCINEX) 600 MG extended release tablet; Take 2 tablets by mouth 2 times daily  Dispense: 40 tablet; Refill: 0  - benzonatate (TESSALON PERLES) 100 MG capsule; Take 1 capsule by mouth 3 times daily as needed for Cough  Dispense: 30 capsule; Refill: 0  - POCT Influenza A/B    3.  Nonintractable headache, unspecified chronicity pattern, unspecified headache type  -OTC Motrin or Tylenol, as directed, around the clock for 3-4 days    4. Congestion of nasal sinus  -OTC Decongestants as directed    5. Sorethroat  OTC Motrin or Tylenol, as directed, around the clock for 3-4 days    6. Generalized body aches  OTC Motrin or Tylenol, as directed, around the clock for 3-4 days  - POCT Influenza A/B    7. Needs flu shot  - Influenza, Quadv, 3 yrs and older, IM, PF, Prefill Syr or SDV, 0.5mL (FLUZONE QUADV, PF)    · Lots of fluids - half of you body weight in ounces daily  Will give Tessalon for the cough, try to only take at night  Get plenty of rest  Increase fluids  Avoid secondhand smoke  Can use the Mora Pot to rinse the sinuses as needed  Cool-mist humidifier at night   Use Tylenol or Ibuprofen (motrin) OTC as directed for fever  Mucinex prescribed for the cough, take with lots of water  OTC decongestant for 3-4 days to relieve congestion  Return to office  if symptoms do not start to improve over the 7-10 days  · Call for appointment if symptoms persist or if develops new symptoms such as wheezing or shortness of breath. Return if symptoms worsen or fail to improve. within 7-10 days. An electronic signature was used to authenticate this note.     --YI Schmid - CNP on 9/20/2018 at 9:38 AM

## 2018-09-20 NOTE — PATIENT INSTRUCTIONS
· Lots of fluids - half of you body weight in ounces daily  Will give Tessalon for the cough, try to only take at night  Get plenty of rest  Increase fluids  Avoid secondhand smoke  Can use the Mora Pot to rinse the sinuses as needed  Cool-mist humidifier at night   Use Tylenol or Ibuprofen (motrin) OTC as directed for fever  Mucinex or Robitussin liquid for the cough, take with lots of water  OTC decongestant for 3-4 days to relieve congestion  Return to office  if symptoms do not start to improve over the 7-10 days  · Call for appointment if symptoms persist or if develops new symptoms such as wheezing or shortness of breath. Patient Education        Cough: Care Instructions  Your Care Instructions    A cough is your body's response to something that bothers your throat or airways. Many things can cause a cough. You might cough because of a cold or the flu, bronchitis, or asthma. Smoking, postnasal drip, allergies, and stomach acid that backs up into your throat also can cause coughs. A cough is a symptom, not a disease. Most coughs stop when the cause, such as a cold, goes away. You can take a few steps at home to cough less and feel better. Follow-up care is a key part of your treatment and safety. Be sure to make and go to all appointments, and call your doctor if you are having problems. It's also a good idea to know your test results and keep a list of the medicines you take. How can you care for yourself at home? · Drink lots of water and other fluids. This helps thin the mucus and soothes a dry or sore throat. Honey or lemon juice in hot water or tea may ease a dry cough. · Take cough medicine as directed by your doctor. · Prop up your head on pillows to help you breathe and ease a dry cough. · Try cough drops to soothe a dry or sore throat. Cough drops don't stop a cough. Medicine-flavored cough drops are no better than candy-flavored drops or hard candy. · Do not smoke.  Avoid secondhand smoke. If you need help quitting, talk to your doctor about stop-smoking programs and medicines. These can increase your chances of quitting for good. When should you call for help? Call 911 anytime you think you may need emergency care. For example, call if:    · You have severe trouble breathing.    Call your doctor now or seek immediate medical care if:    · You cough up blood.     · You have new or worse trouble breathing.     · You have a new or higher fever.     · You have a new rash.    Watch closely for changes in your health, and be sure to contact your doctor if:    · You cough more deeply or more often, especially if you notice more mucus or a change in the color of your mucus.     · You have new symptoms, such as a sore throat, an earache, or sinus pain.     · You do not get better as expected. Where can you learn more? Go to https://Ecofootpepiceweb.BitGym. org and sign in to your [employer] account. Enter D279 in the [employer] box to learn more about \"Cough: Care Instructions. \"     If you do not have an account, please click on the \"Sign Up Now\" link. Current as of: December 6, 2017  Content Version: 11.7  © 5233-5656 [employer] Care instructions adapted under license by [facility] ([facility]). If you have questions about a medical condition or this instruction, always ask your healthcare professional. [place] [address] any warranty or liability for your use of this information. Patient Education        Headache: Care Instructions  Your Care Instructions    Headaches have many possible causes. Most headaches aren't a sign of a more serious problem, and they will get better on their own. Home treatment may help you feel better faster. The doctor has checked you carefully, but problems can develop later. If you notice any problems or new symptoms, get medical treatment right away. Follow-up care is a key part of your treatment and safety.  Be sure to Version: 11.7  © 0159-0916 [employer], [employer]. Care instructions adapted under license by [facility] ([facility]). If you have questions about a medical condition or this instruction, always ask your healthcare professional. [address] any warranty or liability for your use of this information. Patient Education        Upper Respiratory Infection (Cold): Care Instructions  Your Care Instructions    An upper respiratory infection, or URI, is an infection of the nose, sinuses, or throat. URIs are spread by coughs, sneezes, and direct contact. The common cold is the most frequent kind of URI. The flu and sinus infections are other kinds of URIs. Almost all URIs are caused by viruses. Antibiotics won't cure them. But you can treat most infections with home care. This may include drinking lots of fluids and taking over-the-counter pain medicine. You will probably feel better in 4 to 10 days. The doctor has checked you carefully, but problems can develop later. If you notice any problems or new symptoms, get medical treatment right away. Follow-up care is a key part of your treatment and safety. Be sure to make and go to all appointments, and call your doctor if you are having problems. It's also a good idea to know your test results and keep a list of the medicines you take. How can you care for yourself at home? · To prevent dehydration, drink plenty of fluids, enough so that your urine is light yellow or clear like water. Choose water and other caffeine-free clear liquids until you feel better. If you have kidney, heart, or liver disease and have to limit fluids, talk with your doctor before you increase the amount of fluids you drink. · Take an over-the-counter pain medicine, such as acetaminophen (Tylenol), ibuprofen (Advil, Motrin), or naproxen (Aleve). Read and follow all instructions on the label. · Before you use cough and cold medicines, check the label.  These medicines may not be safe for young children or for people with certain health problems. · Be careful when taking over-the-counter cold or flu medicines and Tylenol at the same time. Many of these medicines have acetaminophen, which is Tylenol. Read the labels to make sure that you are not taking more than the recommended dose. Too much acetaminophen (Tylenol) can be harmful. · Get plenty of rest.  · Do not smoke or allow others to smoke around you. If you need help quitting, talk to your doctor about stop-smoking programs and medicines. These can increase your chances of quitting for good. When should you call for help? Call 911 anytime you think you may need emergency care. For example, call if:    · You have severe trouble breathing.    Call your doctor now or seek immediate medical care if:    · You seem to be getting much sicker.     · You have new or worse trouble breathing.     · You have a new or higher fever.     · You have a new rash.    Watch closely for changes in your health, and be sure to contact your doctor if:    · You have a new symptom, such as a sore throat, an earache, or sinus pain.     · You cough more deeply or more often, especially if you notice more mucus or a change in the color of your mucus.     · You do not get better as expected. Where can you learn more? Go to https://ideaTree - innovate | mentor | invest.NsGene. org and sign in to your [employer] account. Enter F925 in the [facility] box to learn more about \"Upper Respiratory Infection (Cold): Care Instructions. \"     If you do not have an account, please click on the \"Sign Up Now\" link. Current as of: December 6, 2017  Content Version: 11.7  © 8162-1404 [employer], [employer]. Care instructions adapted under license by [facility] ([facility]). If you have questions about a medical condition or this instruction, always ask your healthcare professional. [place] [address] any warranty or liability for your use of this information.        Patient Education        Viral Respiratory Infection: Care Instructions  Your Care Instructions    Viruses are very small organisms. They grow in number after they enter your body. There are many types that cause different illnesses, such as colds and the mumps. The symptoms of a viral respiratory infection often start quickly. They include a fever, sore throat, and runny nose. You may also just not feel well. Or you may not want to eat much. Most viral respiratory infections are not serious. They usually get better with time and self-care. Antibiotics are not used to treat a viral infection. That's because antibiotics will not help cure a viral illness. In some cases, antiviral medicine can help your body fight a serious viral infection. Follow-up care is a key part of your treatment and safety. Be sure to make and go to all appointments, and call your doctor if you are having problems. It's also a good idea to know your test results and keep a list of the medicines you take. How can you care for yourself at home? · Rest as much as possible until you feel better. · Be safe with medicines. Take your medicine exactly as prescribed. Call your doctor if you think you are having a problem with your medicine. You will get more details on the specific medicine your doctor prescribes. · Take an over-the-counter pain medicine, such as acetaminophen (Tylenol), ibuprofen (Advil, Motrin), or naproxen (Aleve), as needed for pain and fever. Read and follow all instructions on the label. Do not give aspirin to anyone younger than 20. It has been linked to Reye syndrome, a serious illness. · Drink plenty of fluids, enough so that your urine is light yellow or clear like water. Hot fluids, such as tea or soup, may help relieve congestion in your nose and throat. If you have kidney, heart, or liver disease and have to limit fluids, talk with your doctor before you increase the amount of fluids you drink.   · Try to clear mucus from your lungs by breathing deeply and coughing. · Gargle with warm salt water once an hour. This can help reduce swelling and throat pain. Use 1 teaspoon of salt mixed in 1 cup of warm water. · Do not smoke or allow others to smoke around you. If you need help quitting, talk to your doctor about stop-smoking programs and medicines. These can increase your chances of quitting for good. To avoid spreading the virus  · Cough or sneeze into a tissue. Then throw the tissue away. · If you don't have a tissue, use your hand to cover your cough or sneeze. Then clean your hand. You can also cough into your sleeve. · Wash your hands often. Use soap and warm water. Wash for 15 to 20 seconds each time. · If you don't have soap and water near you, you can clean your hands with alcohol wipes or gel. When should you call for help? Call your doctor now or seek immediate medical care if:    · You have a new or higher fever.     · Your fever lasts more than 48 hours.     · You have trouble breathing.     · You have a fever with a stiff neck or a severe headache.     · You are sensitive to light.     · You feel very sleepy or confused.    Watch closely for changes in your health, and be sure to contact your doctor if:    · You do not get better as expected. Where can you learn more? Go to https://DGTSpeCliniCast.Baitianshi. org and sign in to your [email or web address] account. Enter H963 in the [facility] box to learn more about \"Viral Respiratory Infection: Care Instructions. \"     If you do not have an account, please click on the \"Sign Up Now\" link. Current as of: December 6, 2017  Content Version: 11.7  © 7070-9500 [employer]. Care instructions adapted under license by [facility] ([facility]). If you have questions about a medical condition or this instruction, always ask your healthcare professional. [place] [address] any warranty or liability for your use of this information.

## 2018-10-01 ENCOUNTER — OFFICE VISIT (OUTPATIENT)
Dept: FAMILY MEDICINE CLINIC | Age: 43
End: 2018-10-01
Payer: COMMERCIAL

## 2018-10-01 VITALS
TEMPERATURE: 98.3 F | DIASTOLIC BLOOD PRESSURE: 80 MMHG | BODY MASS INDEX: 30.22 KG/M2 | OXYGEN SATURATION: 97 % | RESPIRATION RATE: 12 BRPM | HEIGHT: 64 IN | WEIGHT: 177 LBS | HEART RATE: 87 BPM | SYSTOLIC BLOOD PRESSURE: 123 MMHG

## 2018-10-01 DIAGNOSIS — J18.0 ACUTE BRONCHOPNEUMONIA: Primary | ICD-10-CM

## 2018-10-01 PROCEDURE — G8427 DOCREV CUR MEDS BY ELIG CLIN: HCPCS | Performed by: FAMILY MEDICINE

## 2018-10-01 PROCEDURE — 96372 THER/PROPH/DIAG INJ SC/IM: CPT | Performed by: FAMILY MEDICINE

## 2018-10-01 PROCEDURE — G8417 CALC BMI ABV UP PARAM F/U: HCPCS | Performed by: FAMILY MEDICINE

## 2018-10-01 PROCEDURE — 1036F TOBACCO NON-USER: CPT | Performed by: FAMILY MEDICINE

## 2018-10-01 PROCEDURE — 99213 OFFICE O/P EST LOW 20 MIN: CPT | Performed by: FAMILY MEDICINE

## 2018-10-01 PROCEDURE — G8482 FLU IMMUNIZE ORDER/ADMIN: HCPCS | Performed by: FAMILY MEDICINE

## 2018-10-01 RX ORDER — HYDROCODONE POLISTIREX AND CHLORPHENIRAMINE POLISTIREX 10; 8 MG/5ML; MG/5ML
5 SUSPENSION, EXTENDED RELEASE ORAL EVERY 12 HOURS PRN
Qty: 100 ML | Refills: 0 | Status: SHIPPED | OUTPATIENT
Start: 2018-10-01 | End: 2018-10-11

## 2018-10-01 RX ORDER — AMOXICILLIN AND CLAVULANATE POTASSIUM 875; 125 MG/1; MG/1
1 TABLET, FILM COATED ORAL 2 TIMES DAILY
Qty: 20 TABLET | Refills: 0 | Status: SHIPPED | OUTPATIENT
Start: 2018-10-01 | End: 2018-10-11

## 2018-10-01 RX ORDER — BETAMETHASONE SODIUM PHOSPHATE AND BETAMETHASONE ACETATE 3; 3 MG/ML; MG/ML
6 INJECTION, SUSPENSION INTRA-ARTICULAR; INTRALESIONAL; INTRAMUSCULAR; SOFT TISSUE ONCE
Status: COMPLETED | OUTPATIENT
Start: 2018-10-01 | End: 2018-10-01

## 2018-10-01 RX ADMIN — BETAMETHASONE SODIUM PHOSPHATE AND BETAMETHASONE ACETATE 6 MG: 3; 3 INJECTION, SUSPENSION INTRA-ARTICULAR; INTRALESIONAL; INTRAMUSCULAR; SOFT TISSUE at 12:47

## 2018-12-07 ENCOUNTER — OFFICE VISIT (OUTPATIENT)
Dept: FAMILY MEDICINE CLINIC | Age: 43
End: 2018-12-07
Payer: COMMERCIAL

## 2018-12-07 VITALS
WEIGHT: 182 LBS | TEMPERATURE: 98.1 F | HEART RATE: 71 BPM | SYSTOLIC BLOOD PRESSURE: 126 MMHG | HEIGHT: 64 IN | OXYGEN SATURATION: 99 % | DIASTOLIC BLOOD PRESSURE: 81 MMHG | BODY MASS INDEX: 31.07 KG/M2

## 2018-12-07 DIAGNOSIS — Z00.00 WELL ADULT EXAM: Primary | ICD-10-CM

## 2018-12-07 DIAGNOSIS — Z13.1 DIABETES MELLITUS SCREENING: ICD-10-CM

## 2018-12-07 DIAGNOSIS — Z13.220 LIPID SCREENING: ICD-10-CM

## 2018-12-07 PROCEDURE — G8482 FLU IMMUNIZE ORDER/ADMIN: HCPCS | Performed by: FAMILY MEDICINE

## 2018-12-07 PROCEDURE — 99396 PREV VISIT EST AGE 40-64: CPT | Performed by: FAMILY MEDICINE

## 2018-12-07 ASSESSMENT — ENCOUNTER SYMPTOMS
SORE THROAT: 0
RECTAL PAIN: 0
COUGH: 0
BACK PAIN: 0
STRIDOR: 0
SHORTNESS OF BREATH: 0
BLOOD IN STOOL: 0
EYE PAIN: 0
WHEEZING: 0
NAUSEA: 0
EYE DISCHARGE: 0
SINUS PRESSURE: 0
COLOR CHANGE: 0
TROUBLE SWALLOWING: 0
ABDOMINAL PAIN: 0
VOICE CHANGE: 0
RHINORRHEA: 0
CONSTIPATION: 0
SINUS PAIN: 0
VOMITING: 0
DIARRHEA: 0
EYE REDNESS: 0
PHOTOPHOBIA: 0
CHEST TIGHTNESS: 0
CHOKING: 0
ABDOMINAL DISTENTION: 0
EYE ITCHING: 0
ANAL BLEEDING: 0
APNEA: 0
FACIAL SWELLING: 0

## 2018-12-07 NOTE — PROGRESS NOTES
light-headedness, numbness and headaches. Hematological: Negative for adenopathy. Does not bruise/bleed easily. Psychiatric/Behavioral: Negative for agitation, behavioral problems, confusion, decreased concentration, dysphoric mood, hallucinations, self-injury, sleep disturbance and suicidal ideas. The patient is not nervous/anxious and is not hyperactive. Health Habits: With regard his health habits he states he eats 3 meals per day and 3 snacks per day. He exercises regularly: yes. Physical activities include: riding bike, 4 times per week, 30 minutes/day. He does not take over the counter vitamins. He has not had blood transfussion or tattoos. He does wear seatbelts while driving a car. He does not talk on the phone or text while driving. He is . He works 60 hours per week. He is  content with his life. His stress level is 7 in a scale 1-10. Health Maintenance   Topic Date Due    Lipid screen  11/17/2022    Pneumococcal highest risk (3 of 3 - PPSV23) 11/17/2022    DTaP/Tdap/Td vaccine (2 - Td) 09/14/2026    Flu vaccine  Completed    HIV screen  Completed       He  reports that he quit smoking about 10 years ago. His smoking use included Cigarettes. He has a 5.00 pack-year smoking history. He has never used smokeless tobacco. He reports that he drinks alcohol. He reports that he does not use drugs. Shawn Romero He is sexually active. He does not perform regular testicular self exams. [facility]  [address], [address]  Ph:   [phone]  Fax: [phone]    Provider:  Dr. Kevin Shelley NOTES     HPI:  Joseluis Vieira is a 37y.o. year old male, who comes today for an annual wellness visit.       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

## 2019-02-01 ENCOUNTER — HOSPITAL ENCOUNTER (OUTPATIENT)
Age: 44
Discharge: HOME | End: 2019-02-01
Payer: COMMERCIAL

## 2019-02-01 DIAGNOSIS — D47.2 SMOLDERING MULTIPLE MYELOMA: Primary | ICD-10-CM

## 2019-02-01 DIAGNOSIS — D47.2 SMOLDERING MULTIPLE MYELOMA: ICD-10-CM

## 2019-02-01 LAB
BASINOPHIL, AUTOMATED: 0 % (ref 0–3)
BUN, WHOLE BLOOD: 14 MG/DL (ref 8–26)
CALCIUM SERPL-MCNC: 9.1 MG/DL (ref 8.5–10.5)
CHLORIDE, WHOLE BLOOD: 102 MEQ/L (ref 98–109)
CREATININE, WHOLE BLOOD: 0.8 MG/DL (ref 0.5–1.2)
EOSINOPHILS RELATIVE PERCENT: 3 % (ref 0–4)
GFR, ESTIMATED: > 90 ML/MIN/1.73M2
GLUCOSE, WHOLE BLOOD: 82 MG/DL (ref 70–108)
HCT VFR BLD CALC: 42.2 % (ref 42–52)
HEMOGLOBIN: 13.9 GM/DL (ref 14–18)
IONIZED CALCIUM, WHOLE BLOOD: 1.2 MMOL/L (ref 1.12–1.32)
LYMPHOCYTES # BLD: 40 % (ref 15–47)
MCH RBC QN AUTO: 27.6 PG (ref 27–31)
MCHC RBC AUTO-ENTMCNC: 33 GM/DL (ref 33–37)
MCV RBC AUTO: 84 FL (ref 80–94)
MONOCYTES: 11 % (ref 0–12)
PDW BLD-RTO: 11.4 % (ref 11.5–14.5)
PLATELET # BLD: 243 THOU/MM3 (ref 130–400)
PMV BLD AUTO: 8.1 FL (ref 7.4–10.4)
POTASSIUM, WHOLE BLOOD: 4 MEQ/L (ref 3.5–4.9)
RBC # BLD: 5.04 MILL/MM3 (ref 4.7–6.1)
SEG NEUTROPHILS: 45 % (ref 43–75)
SODIUM, WHOLE BLOOD: 140 MEQ/L (ref 138–146)
TOTAL CO2, WHOLE BLOOD: 26 MEQ/L (ref 23–33)
WBC # BLD: 6.4 THOU/MM3 (ref 4.8–10.8)

## 2019-02-01 PROCEDURE — 84160 ASSAY OF PROTEIN ANY SOURCE: CPT

## 2019-02-01 PROCEDURE — 83883 ASSAY NEPHELOMETRY NOT SPEC: CPT

## 2019-02-01 PROCEDURE — 82784 ASSAY IGA/IGD/IGG/IGM EACH: CPT

## 2019-02-01 PROCEDURE — 80047 BASIC METABLC PNL IONIZED CA: CPT

## 2019-02-01 PROCEDURE — 84165 PROTEIN E-PHORESIS SERUM: CPT

## 2019-02-01 PROCEDURE — 86334 IMMUNOFIX E-PHORESIS SERUM: CPT

## 2019-02-01 PROCEDURE — 82310 ASSAY OF CALCIUM: CPT

## 2019-02-01 PROCEDURE — 85025 COMPLETE CBC W/AUTO DIFF WBC: CPT

## 2019-02-01 PROCEDURE — 36415 COLL VENOUS BLD VENIPUNCTURE: CPT

## 2019-02-04 LAB
IMMUNOFIXATION ELECTROPHORESIS: NORMAL
KAPPA/LAMBDA FREE LIGHT CHAINS: NORMAL

## 2019-02-07 ENCOUNTER — NURSE ONLY (OUTPATIENT)
Dept: FAMILY MEDICINE CLINIC | Age: 44
End: 2019-02-07
Payer: COMMERCIAL

## 2019-02-07 ENCOUNTER — TELEPHONE (OUTPATIENT)
Dept: FAMILY MEDICINE CLINIC | Age: 44
End: 2019-02-07

## 2019-02-07 DIAGNOSIS — Z13.220 LIPID SCREENING: ICD-10-CM

## 2019-02-07 DIAGNOSIS — Z13.1 DIABETES MELLITUS SCREENING: ICD-10-CM

## 2019-02-07 LAB
CHOLESTEROL, TOTAL: 185 MG/DL (ref 100–199)
GLUCOSE FASTING: 99 MG/DL (ref 70–108)
HDLC SERPL-MCNC: 53 MG/DL
LDL CHOLESTEROL CALCULATED: 114 MG/DL
TRIGL SERPL-MCNC: 88 MG/DL (ref 0–199)

## 2019-02-07 PROCEDURE — 36415 COLL VENOUS BLD VENIPUNCTURE: CPT | Performed by: FAMILY MEDICINE

## 2019-02-08 ENCOUNTER — HOSPITAL ENCOUNTER (OUTPATIENT)
Dept: INFUSION THERAPY | Age: 44
Discharge: HOME | End: 2019-02-08
Payer: COMMERCIAL

## 2019-02-08 ENCOUNTER — OFFICE VISIT (OUTPATIENT)
Dept: ONCOLOGY | Age: 44
End: 2019-02-08
Payer: COMMERCIAL

## 2019-02-08 VITALS
RESPIRATION RATE: 16 BRPM | TEMPERATURE: 97.8 F | SYSTOLIC BLOOD PRESSURE: 132 MMHG | HEART RATE: 73 BPM | DIASTOLIC BLOOD PRESSURE: 80 MMHG | OXYGEN SATURATION: 99 % | BODY MASS INDEX: 32.1 KG/M2 | WEIGHT: 188 LBS | HEIGHT: 64 IN

## 2019-02-08 DIAGNOSIS — D47.2 SMOLDERING MULTIPLE MYELOMA: Primary | ICD-10-CM

## 2019-02-08 PROCEDURE — G8417 CALC BMI ABV UP PARAM F/U: HCPCS | Performed by: INTERNAL MEDICINE

## 2019-02-08 PROCEDURE — 99213 OFFICE O/P EST LOW 20 MIN: CPT | Performed by: INTERNAL MEDICINE

## 2019-02-08 PROCEDURE — G8482 FLU IMMUNIZE ORDER/ADMIN: HCPCS | Performed by: INTERNAL MEDICINE

## 2019-02-08 PROCEDURE — 99211 OFF/OP EST MAY X REQ PHY/QHP: CPT

## 2019-02-08 PROCEDURE — G8427 DOCREV CUR MEDS BY ELIG CLIN: HCPCS | Performed by: INTERNAL MEDICINE

## 2019-02-08 PROCEDURE — 1036F TOBACCO NON-USER: CPT | Performed by: INTERNAL MEDICINE

## 2019-02-08 ASSESSMENT — ENCOUNTER SYMPTOMS
RECTAL PAIN: 0
NAUSEA: 0
COUGH: 0
COLOR CHANGE: 0
ABDOMINAL PAIN: 0
CONSTIPATION: 0
BLOOD IN STOOL: 0
CHEST TIGHTNESS: 0
FACIAL SWELLING: 0
EYE DISCHARGE: 0
SHORTNESS OF BREATH: 0
BACK PAIN: 0
VOMITING: 0
WHEEZING: 0
DIARRHEA: 0
SORE THROAT: 0
ABDOMINAL DISTENTION: 0
TROUBLE SWALLOWING: 0

## 2019-02-13 DIAGNOSIS — D47.2 SMOLDERING MULTIPLE MYELOMA: Primary | ICD-10-CM

## 2019-02-27 ENCOUNTER — HOSPITAL ENCOUNTER (EMERGENCY)
Dept: GENERAL RADIOLOGY | Age: 44
Discharge: HOME | End: 2019-02-27
Payer: COMMERCIAL

## 2019-02-27 ENCOUNTER — HOSPITAL ENCOUNTER (EMERGENCY)
Age: 44
Discharge: HOME | End: 2019-02-27
Attending: EMERGENCY MEDICINE
Payer: COMMERCIAL

## 2019-02-27 VITALS
RESPIRATION RATE: 16 BRPM | SYSTOLIC BLOOD PRESSURE: 128 MMHG | DIASTOLIC BLOOD PRESSURE: 89 MMHG | BODY MASS INDEX: 31.38 KG/M2 | TEMPERATURE: 97.9 F | OXYGEN SATURATION: 97 % | HEART RATE: 87 BPM | WEIGHT: 180 LBS

## 2019-02-27 DIAGNOSIS — S20.211A BRUISED RIBS, RIGHT, INITIAL ENCOUNTER: ICD-10-CM

## 2019-02-27 DIAGNOSIS — S20.211A CONTUSION OF RIGHT CHEST WALL, INITIAL ENCOUNTER: Primary | ICD-10-CM
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
## 2019-02-27 PROCEDURE — 99213 OFFICE O/P EST LOW 20 MIN: CPT | Performed by: EMERGENCY MEDICINE

## 2019-02-27 PROCEDURE — 71101 X-RAY EXAM UNILAT RIBS/CHEST: CPT

## 2019-02-27 PROCEDURE — 99213 OFFICE O/P EST LOW 20 MIN: CPT

## 2019-02-27 RX ORDER — TRAMADOL HYDROCHLORIDE 50 MG/1
50 TABLET ORAL EVERY 4 HOURS PRN
Qty: 12 TABLET | Refills: 0 | Status: SHIPPED | OUTPATIENT
Start: 2019-02-27 | End: 2019-03-05

## 2019-02-27 RX ORDER — IBUPROFEN 600 MG/1
600 TABLET ORAL 3 TIMES DAILY PRN
Qty: 20 TABLET | Refills: 0 | Status: SHIPPED | OUTPATIENT
Start: 2019-02-27 | End: 2020-12-18 | Stop reason: ALTCHOICE

## 2019-02-27 ASSESSMENT — PAIN - FUNCTIONAL ASSESSMENT: PAIN_FUNCTIONAL_ASSESSMENT: PREVENTS OR INTERFERES SOME ACTIVE ACTIVITIES AND ADLS

## 2019-02-27 ASSESSMENT — ENCOUNTER SYMPTOMS
VOICE CHANGE: 0
SINUS PRESSURE: 0
STRIDOR: 0
EYE PAIN: 0
VOMITING: 0
NAUSEA: 0
COUGH: 0
EYE REDNESS: 0
DIARRHEA: 0
ABDOMINAL PAIN: 0
SORE THROAT: 0
BACK PAIN: 0
TROUBLE SWALLOWING: 0
SHORTNESS OF BREATH: 0
WHEEZING: 0
EYE DISCHARGE: 0

## 2019-02-27 ASSESSMENT — PAIN DESCRIPTION - FREQUENCY: FREQUENCY: CONTINUOUS

## 2019-02-27 ASSESSMENT — PAIN DESCRIPTION - PROGRESSION: CLINICAL_PROGRESSION: GRADUALLY IMPROVING

## 2019-02-27 ASSESSMENT — PAIN DESCRIPTION - PAIN TYPE: TYPE: ACUTE PAIN

## 2019-02-27 ASSESSMENT — PAIN DESCRIPTION - ORIENTATION: ORIENTATION: RIGHT

## 2019-02-27 ASSESSMENT — PAIN DESCRIPTION - DESCRIPTORS: DESCRIPTORS: CONSTANT

## 2019-02-27 ASSESSMENT — PAIN DESCRIPTION - ONSET: ONSET: SUDDEN

## 2019-02-27 ASSESSMENT — PAIN SCALES - GENERAL: PAINLEVEL_OUTOF10: 7

## 2019-02-27 ASSESSMENT — PAIN DESCRIPTION - LOCATION: LOCATION: RIB CAGE

## 2019-02-28 ENCOUNTER — TELEPHONE (OUTPATIENT)
Dept: FAMILY MEDICINE CLINIC | Age: 44
End: 2019-02-28

## 2019-06-07 ENCOUNTER — OFFICE VISIT (OUTPATIENT)
Dept: FAMILY MEDICINE CLINIC | Age: 44
End: 2019-06-07
Payer: COMMERCIAL

## 2019-06-07 VITALS
HEART RATE: 71 BPM | WEIGHT: 181.2 LBS | HEIGHT: 63 IN | BODY MASS INDEX: 32.11 KG/M2 | TEMPERATURE: 98.7 F | RESPIRATION RATE: 12 BRPM | SYSTOLIC BLOOD PRESSURE: 119 MMHG | DIASTOLIC BLOOD PRESSURE: 69 MMHG

## 2019-06-07 DIAGNOSIS — Z00.00 ROUTINE MEDICAL EXAM: ICD-10-CM

## 2019-06-07 DIAGNOSIS — D47.2 SMOLDERING MULTIPLE MYELOMA: Primary | ICD-10-CM

## 2019-06-07 PROCEDURE — G8427 DOCREV CUR MEDS BY ELIG CLIN: HCPCS | Performed by: NURSE PRACTITIONER

## 2019-06-07 PROCEDURE — 99214 OFFICE O/P EST MOD 30 MIN: CPT | Performed by: NURSE PRACTITIONER

## 2019-06-07 PROCEDURE — 1036F TOBACCO NON-USER: CPT | Performed by: NURSE PRACTITIONER

## 2019-06-07 PROCEDURE — G8417 CALC BMI ABV UP PARAM F/U: HCPCS | Performed by: NURSE PRACTITIONER

## 2019-06-07 ASSESSMENT — PATIENT HEALTH QUESTIONNAIRE - PHQ9
SUM OF ALL RESPONSES TO PHQ9 QUESTIONS 1 & 2: 0
2. FEELING DOWN, DEPRESSED OR HOPELESS: 0
SUM OF ALL RESPONSES TO PHQ QUESTIONS 1-9: 0
SUM OF ALL RESPONSES TO PHQ QUESTIONS 1-9: 0
1. LITTLE INTEREST OR PLEASURE IN DOING THINGS: 0

## 2019-06-07 NOTE — PATIENT INSTRUCTIONS
You may receive a survey about your visit with us today. The feedback from our patients helps us identify what is working well and where the service to all patients can be enhanced. Thank you! Patient Education        Body Mass Index: Care Instructions  Your Care Instructions    Body mass index (BMI) can help you see if your weight is raising your risk for health problems. It uses a formula to compare how much you weigh with how tall you are. · A BMI lower than 18.5 is considered underweight. · A BMI between 18.5 and 24.9 is considered healthy. · A BMI between 25 and 29.9 is considered overweight. A BMI of 30 or higher is considered obese. If your BMI is in the normal range, it means that you have a lower risk for weight-related health problems. If your BMI is in the overweight or obese range, you may be at increased risk for weight-related health problems, such as high blood pressure, heart disease, stroke, arthritis or joint pain, and diabetes. If your BMI is in the underweight range, you may be at increased risk for health problems such as fatigue, lower protection (immunity) against illness, muscle loss, bone loss, hair loss, and hormone problems. BMI is just one measure of your risk for weight-related health problems. You may be at higher risk for health problems if you are not active, you eat an unhealthy diet, or you drink too much alcohol or use tobacco products. Follow-up care is a key part of your treatment and safety. Be sure to make and go to all appointments, and call your doctor if you are having problems. It's also a good idea to know your test results and keep a list of the medicines you take. How can you care for yourself at home? · Practice healthy eating habits. This includes eating plenty of fruits, vegetables, whole grains, lean protein, and low-fat dairy. · If your doctor recommends it, get more exercise. Walking is a good choice. Bit by bit, increase the amount you walk every day. Try for at least 30 minutes on most days of the week. · Do not smoke. Smoking can increase your risk for health problems. If you need help quitting, talk to your doctor about stop-smoking programs and medicines. These can increase your chances of quitting for good. · Limit alcohol to 2 drinks a day for men and 1 drink a day for women. Too much alcohol can cause health problems. If you have a BMI higher than 25  · Your doctor may do other tests to check your risk for weight-related health problems. This may include measuring the distance around your waist. A waist measurement of more than 40 inches in men or 35 inches in women can increase the risk of weight-related health problems. · Talk with your doctor about steps you can take to stay healthy or improve your health. You may need to make lifestyle changes to lose weight and stay healthy, such as changing your diet and getting regular exercise. If you have a BMI lower than 18.5  · Your doctor may do other tests to check your risk for health problems. · Talk with your doctor about steps you can take to stay healthy or improve your health. You may need to make lifestyle changes to gain or maintain weight and stay healthy, such as getting more healthy foods in your diet and doing exercises to build muscle. Where can you learn more? Go to https://uberVUtrevaTripleLift.health2-Observe. org and sign in to your [employer] account. Enter S176 in the SkyWire box to learn more about \"Body Mass Index: Care Instructions. \"     If you do not have an account, please click on the \"Sign Up Now\" link. Current as of: June 25, 2018  Content Version: 12.0  © 8844-7372 Healthwise, Incorporated. Care instructions adapted under license by [facility] ([facility]). If you have questions about a medical condition or this instruction, always ask your healthcare professional. [place] [address] any warranty or liability for your use of this information.          Patient Education        Work-Life Balance: Care Instructions  Your Care Instructions    Do you ever feel like there is not enough time to do all of the things you have to do, and no time at all for the things you enjoy? If so, you are not alone. On average, people in the [place] have worked more and more hours each year since 1970. But in recent years, fewer people say they want to take on more at work, even if they would get promoted or get paid more money. More and more workers say they want time to spend with their families and to do things that are important to them. Do you ever feel:  · That you always have more and more work to do at your job? · That too many people depend on you every day? · That you never have enough time for your family or friends? · That you never have time for hobbies or things you enjoy? · That each second of your day is scheduled? If you answered \"yes\" to any of these questions, take steps at work and at home to get your life into balance. Follow-up care is a key part of your treatment and safety. Be sure to make and go to all appointments, and call your doctor if you are having problems. How can you care for yourself at home? Manage your time  · Focus on the important things. Taking on too much can wear you out. Look at how you spend your time, and redirect your focus. Learn to say \"no\" and let go of things that do not matter. · Set one small goal at a time. Use a day planner. Break large projects into smaller ones. · Ask for help. Let your children, your spouse, your coworkers, and other people in your life help you get things done. · Leave your job at the office. If you give up free time to get more work done, you may pay for it with stress. If your job offers a flexible work schedule, use it to fit your own work style. For instance, come in earlier to have a longer lunch break, or make time for a yoga class or workout during your workday. · Unplug.  Do not let technology (such as your cell phone or the Internet) erase the line between your time and your employer's time. Lower job stress  Job stress causes trouble at work and at home. At work, you may worry about things you have not had time to do at home. At home, you may worry about your job. This cycle upsets your work-life balance. Lowering your job stress can get your life back in balance. Job stress can be caused by:  · Pressure and deadlines. · Heavy workloads or long hours. · Not being allowed to make decisions. · Health and safety hazards. · Feeling you may lose your job. · Unclear or changing job duties. · Too much responsibility. · Work that is very tiring or boring. Do any of these things bother you? Consider talking with your boss to change things. There are some things that you may not be able to control. But even a few small changes might help lower your stress. Take advantage of programs at work  Businesses make money and are better off in other ways if their employees are healthy and happy. For this reason, many companies have programs to help balance work life and home life. These programs may include:  · Flexible schedules and hours. · Time off for family reasons, education, or community service. · Being able to work from home. · Employee assistance programs to provide counseling. · Child-care programs. Check to see if your company has any of these or other programs that could help you. If not, consider talking to your boss about why work-life balance programs make good business sense. Even if your company does not start an official program, you may be able to get flexible hours, time off, or the ability to do some work from home. Know when to quit  If you are truly unhappy because of a stressful job, and if the suggestions here have not worked, it may be time to think about changing jobs or changing careers. But before you quit, take time to research your options. Where can you learn more?   Go to https://[email or web address] and sign in to your [email or web address] account. Enter Q342 in the [place] box to learn more about \"Work-Life Balance: Care Instructions. \"     If you do not have an account, please click on the \"Sign Up Now\" link. Current as of: June 28, 2018  Content Version: 12.0  © 6524-7877 [employer]atino. Care instructions adapted under license by [facility] ([facility]). If you have questions about a medical condition or this instruction, always ask your healthcare professional. [place] [address] any warranty or liability for your use of this information. Patient Education        Learning About Stress  What is stress? Stress is what you feel when you have to handle more than you are used to. Stress is a fact of life for most people, and it affects everyone differently. What causes stress for you may not be stressful for someone else. A lot of things can cause stress. You may feel stress when you go on a job interview, take a test, or run a race. This kind of short-term stress is normal and even useful. It can help you if you need to work hard or react quickly. For example, stress can help you finish an important job on time. Stress also can last a long time. Long-term stress is caused by stressful situations or events. Examples of long-term stress include long-term health problems, ongoing problems at work, or conflicts in your family. Long-term stress can harm your health. How does stress affect your health? When you are stressed, your body responds as though you are in danger. It makes hormones that speed up your heart, make you breathe faster, and give you a burst of energy. This is called the fight-or-flight stress response. If the stress is over quickly, your body goes back to normal and no harm is done. But if stress happens too often or lasts too long, it can have bad effects.  Long-term stress can make you more likely to get sick, and it can make symptoms of some diseases worse. If you tense up when you are stressed, you may develop neck, shoulder, or low back pain. Stress is linked to high blood pressure and heart disease. Stress also harms your emotional health. It can make you howell, tense, or depressed. Your relationships may suffer, and you may not do well at work or school. What can you do to manage stress? How to relax your mind  · Write. It may help to write about things that are bothering you. This helps you find out how much stress you feel and what is causing it. When you know this, you can find better ways to cope. · Let your feelings out. Talk, laugh, cry, and express anger when you need to. Talking with friends, family, a counselor, or a member of the clergy about your feelings is a healthy way to relieve stress. · Do something you enjoy. For example, listen to music or go to a movie. Practice your hobby or do volunteer work. · Meditate. This can help you relax, because you are not worrying about what happened before or what may happen in the future. · Do guided imagery. Imagine yourself in any setting that helps you feel calm. You can use audiotapes, books, or a teacher to guide you. How to relax your body  · Do something active. Exercise or activity can help reduce stress. Walking is a great way to get started. Even everyday activities such as housecleaning or yard work can help. · Do breathing exercises. For example:  ? From a standing position, bend forward from the waist with your knees slightly bent. Let your arms dangle close to the floor. ? Breathe in slowly and deeply as you return to a standing position. Roll up slowly and lift your head last.  ? Hold your breath for just a few seconds in the standing position. ? Breathe out slowly and bend forward from the waist.  · Try yoga or nancy chi. These techniques combine exercise and meditation. You may need some training at first to learn them.   What can you do to prevent mind and body to help you cope with illness, pain, and stress. How does mindfulness help to relieve stress? Mindfulness can help quiet your mind and relax your body. Studies show that it can help some people sleep better, feel less anxious, and bring their blood pressure down. And it's been shown to help some people live and cope better with certain health problems like heart disease, depression, chronic pain, and cancer. How do you practice mindfulness? To be mindful is to pay attention, to be present, and to be accepting. · When you're mindful, you do just one thing and you pay close attention to that one thing. For example, you may sit quietly and notice your emotions or how your food tastes and smells. · When you're present, you focus on the things that are happening right now. You let go of your thoughts about the past and the future. When you dwell on the past or the future, you miss moments that can heal and strengthen you. You may miss moments like hearing a child laugh or seeing a friendly face when you think you're all alone. · When you're accepting, you don't  the present moment. Instead you accept your thoughts and feelings as they come. You can practice anytime, anywhere, and in any way you choose. You can practice in many ways. Here are a few ideas:  · While doing your chores, like washing the dishes, let your mind focus on what's in your hand. What does the dish feel like? Is the water warm or cold? · Go outside and take a few deep breaths. What is the air like? Is it warm or cold? · When you can, take some time at the start of your day to sit alone and think. · Take a slow walk by yourself. Count your steps while you breathe in and out. · Try yoga breathing exercises, stretches, and poses to strengthen and relax your muscles. · At work, if you can, try to stop for a few moments each hour. Note how your body feels.  Let yourself regroup and let your mind settle before you return to what you were doing. · If you struggle with anxiety or \"worry thoughts,\" imagine your mind as a blue marsha and your worry thoughts as clouds. Now imagine those worry thoughts floating across your mind's marsha. Just let them pass by as you watch. Follow-up care is a key part of your treatment and safety. Be sure to make and go to all appointments, and call your doctor if you are having problems. It's also a good idea to know your test results and keep a list of the medicines you take. Where can you learn more? Go to https://compropagopepiceweb.CreditEase. org and sign in to your 250ok account. Enter C867 in the [employer] box to learn more about \"Learning About Mindfulness for Stress. \"     If you do not have an account, please click on the \"Sign Up Now\" link. Current as of: June 28, 2018  Content Version: 12.0  © 8495-1548 [employer]. Care instructions adapted under license by [facility] ([facility]). If you have questions about a medical condition or this instruction, always ask your healthcare professional. [place] [address] any warranty or liability for your use of this information. Patient Education        Learning About Progressive Muscle Relaxation for Stress  What are progressive muscle relaxation and stress? Stress is what you feel when you have to handle more than you are used to. A lot of things can cause stress. You may feel stress when you go on a job interview, take a test, or run a race. This kind of short-term stress is normal and even useful. It can help you if you need to work hard or react quickly. Stress also can last a long time. Long-term stress is caused by stressful situations or events. Examples of long-term stress include long-term health problems, ongoing problems at work, and conflicts in your family. Long-term stress can harm your health. When you have anxiety or stress in your life, one of the ways your body responds is with muscle tension. click on the \"Sign Up Now\" link. Current as of: June 28, 2018  Content Version: 12.0  © 4165-6868 Healthwise, Incorporated. Care instructions adapted under license by [facility] ([facility]). If you have questions about a medical condition or this instruction, always ask your healthcare professional. [address] any warranty or liability for your use of this information.

## 2019-06-07 NOTE — PROGRESS NOTES
[address] [place], [address]  Ph:   [phone]  Fax: [phone]    Provider:  YI Shea CNP    Patient:  Delio Nichole  YOB: 1975      Visit Date:  6/7/2019     Reason For Visit:   Chief Complaint   Patient presents with    [address] [facility] [address]        Reese Garcia is a 37 y.o. male who comes today to the office for 6 month follow up. His wellness was in December. Routine lab work was within acceptable ranges. 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 At his last visit with her was in Feb 2018, and his numbers/exam are stable, but she recommended to continue every 6 month visits for now, with the possibility of moving to yearly visits. He denies any fevers, chills, night sweats, or unintentional weight loss. Since his last visit he was seen in the [facility] in Feb 2019 because he was ice skating and fell. He was having right rib pain, so he went to the  3 days later. Xray did not reveal any broken ribs. He was given anti-inflammatories, and after 1 month he felt better. He is starting to ride his bicycle outside now that the weather is nicer. He does wear a helmet. During the colder months he rides a stationary bicycle, but he doesn't enjoy that as much. He states he has been doing well, taking his medications as prescribed. He denies any side effects from his medications. Significant Past Medical History:    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[facility] [address] 10/01/2016    Multiple Myeloma            Allergies:  has No Known Allergies. Medications:   Current Outpatient Medications   Medication Sig Dispense Refill    ibuprofen (ADVIL;MOTRIN) 600 MG tablet Take 1 tablet by mouth 3 times daily as needed for Pain or Fever (Take with food 3 times daily for pain) 20 tablet 0     No current facility-administered medications for this visit.         Review of systems:  Review of Systems - History obtained from the patient  General ROS: negative for - chills, fatigue or fever  Psychological ROS: negative for - anxiety, depression or sleep disturbances  ENT ROS: negative for - headaches, nasal congestion or nasal discharge  Allergy and Immunology ROS: negative for - seasonal allergies  Hematological and Lymphatic ROS: negative for - bruising  Endocrine ROS: negative for - malaise/lethargy, polydipsia/polyuria or temperature intolerance  Respiratory ROS: negative for - cough,  shortness of breath or wheezing  Cardiovascular ROS: negative for - chest pain or edema  Gastrointestinal ROS: negative for - abdominal pain, constipation, diarrhea or nausea/vomiting  Musculoskeletal ROS: negative for - joint pain or muscle pain  Neurological ROS: negative for - dizziness  Dermatological ROS: negative for - rash    Physical Examination:  /69 (Site: Left Upper Arm, Position: Sitting, Cuff Size: Large Adult)   Pulse 71   Temp 98.7 °F (37.1 °C) (Oral)   Resp 12   Ht 5' 3\" (1.6 m)   Wt 181 lb 3.2 oz (82.2 kg)   BMI 32.10 kg/m²     General-  Alert and oriented x 3, NAD  HEENT: NC, AT, PERRLA, EOMI, anicteric sclerae  Ears: Normal tympanic membranes bilaterally  Nose: patent, no lesions  Mouth: no lesions, moist mucosas  Neck - supple, no significant adenopathy  Chest - clear to auscultation, no wheezes, rales or rhonchi, symmetric air entry  Heart - normal rate, regular rhythm, normal S1, S2, no murmurs, rubs, clicks or gallops  Abdomen - soft, nontender, nondistended, no masses or organomegaly  Extremities - peripheral pulses normal, no pedal edema, no clubbing or cyanosis    Impression:   Diagnosis Orders   1. Smoldering multiple myeloma ([address]     2. BMI 32.0-32.9,adult     3. Routine medical exam         Plan:  No orders of the defined types were placed in this encounter. No orders of the defined types were placed in this encounter.   Try to get 30-45 minutes of physical

## 2019-08-05 ENCOUNTER — HOSPITAL ENCOUNTER (OUTPATIENT)
Age: 44
Discharge: HOME | End: 2019-08-05

## 2019-08-05 DIAGNOSIS — D47.2 SMOLDERING MULTIPLE MYELOMA: ICD-10-CM

## 2019-08-05 PROCEDURE — 84165 PROTEIN E-PHORESIS SERUM: CPT

## 2019-08-05 PROCEDURE — 82784 ASSAY IGA/IGD/IGG/IGM EACH: CPT

## 2019-08-05 PROCEDURE — 84160 ASSAY OF PROTEIN ANY SOURCE: CPT

## 2019-08-05 PROCEDURE — 36415 COLL VENOUS BLD VENIPUNCTURE: CPT

## 2019-08-05 PROCEDURE — 86334 IMMUNOFIX E-PHORESIS SERUM: CPT

## 2019-08-05 PROCEDURE — 83883 ASSAY NEPHELOMETRY NOT SPEC: CPT

## 2019-08-09 ENCOUNTER — OFFICE VISIT (OUTPATIENT)
Dept: ONCOLOGY | Age: 44
End: 2019-08-09
Payer: COMMERCIAL

## 2019-08-09 ENCOUNTER — HOSPITAL ENCOUNTER (OUTPATIENT)
Dept: INFUSION THERAPY | Age: 44
Discharge: HOME | End: 2019-08-09

## 2019-08-09 VITALS
HEART RATE: 71 BPM | RESPIRATION RATE: 16 BRPM | DIASTOLIC BLOOD PRESSURE: 69 MMHG | HEIGHT: 63 IN | OXYGEN SATURATION: 100 % | BODY MASS INDEX: 33.27 KG/M2 | TEMPERATURE: 99.1 F | WEIGHT: 187.8 LBS | SYSTOLIC BLOOD PRESSURE: 111 MMHG

## 2019-08-09 DIAGNOSIS — D47.2 SMOLDERING MULTIPLE MYELOMA: Primary | ICD-10-CM

## 2019-08-09 LAB
IMMUNOFIXATION ELECTROPHORESIS: NORMAL
KAPPA/LAMBDA FREE LIGHT CHAINS: NORMAL

## 2019-08-09 PROCEDURE — 99214 OFFICE O/P EST MOD 30 MIN: CPT | Performed by: INTERNAL MEDICINE

## 2019-08-09 PROCEDURE — G8427 DOCREV CUR MEDS BY ELIG CLIN: HCPCS | Performed by: INTERNAL MEDICINE

## 2019-08-09 PROCEDURE — 99211 OFF/OP EST MAY X REQ PHY/QHP: CPT

## 2019-08-09 PROCEDURE — 1036F TOBACCO NON-USER: CPT | Performed by: INTERNAL MEDICINE

## 2019-08-09 PROCEDURE — G8417 CALC BMI ABV UP PARAM F/U: HCPCS | Performed by: INTERNAL MEDICINE

## 2019-08-09 ASSESSMENT — ENCOUNTER SYMPTOMS
CHEST TIGHTNESS: 0
BACK PAIN: 0
FACIAL SWELLING: 0
COUGH: 0
ABDOMINAL DISTENTION: 0
EYE DISCHARGE: 0
RECTAL PAIN: 0
SHORTNESS OF BREATH: 0
NAUSEA: 0
CONSTIPATION: 0
ABDOMINAL PAIN: 0
SORE THROAT: 0
TROUBLE SWALLOWING: 0
BLOOD IN STOOL: 0
COLOR CHANGE: 0
WHEEZING: 0
VOMITING: 0
DIARRHEA: 0

## 2019-08-09 NOTE — PROGRESS NOTES
[facility] PROFESSIONAL SERVS  [facility]  [address] [facility] [address]  [address] [facility] [address]  [address] [address]  Dept: [phone]  Dept Fax: [phone]: [phone]     Visit Date: 8/9/2019     Victor Manuel Phillips is a 37 y.o. male who presents today for:   Chief Complaint   Patient presents with    Follow-up     Mutliple Myeloma        HPI     Giancarlo Donaldson is a 43 y.o. male with smoldering multiple myeloma. When he applied for life insurance he was denied because his total protein and globulin were elevated on screening lab work. Therefore he underwent further testing in June 2016. The immunofixation electrophoresis showed M-spike in the gamma region. The monoclonal protein peak accounted for 3.17 g/dL of the total 3.34 g/dL of protein in  the gamma region.  PAU pattern showed an IgG type kappa monoclonal protein. The patient had a bone marrow biopsy, cytogenetic analysis and bone survey. Bone marrow biopsy showed increased mature plasma cells (17%). No suspicious osseous lesions were identified on the bone survey. His labs met criteria for SMM ( an M-protein ?3 g/dL, 17% bone marrow plasma cells and no end-organ damage). The patient is currently followed by surveillance, no active treatment    Interim history on 08/09/2019: Today, he presents for  6 months follow-up visit. Patient remains asymptomatic, he denies having any B symptoms, no bone pain, no peripheral neuropathy. He denies having any fevers, no recent infections. He continues to maintain weight with diet and exercise. He denies being placed on any new medications since last visit.   No visits to the emergency room, no admissions to the hospital.    Family History   Problem Relation Age of Onset    High Blood Pressure Mother     Other Mother         osteoporosis    Asthma Father     High Blood Pressure Father       Social History     Tobacco Use    Smoking status: Former Smoker     Packs/day: 0.50     Years: 10.00     Pack years: 5.00 02/01/2019    MCV 84 02/01/2019     02/01/2019       Chemistry        Component Value Date/Time     02/01/2019 1135     11/17/2017 1045    K 4.0 02/01/2019 1135    K 4.2 11/17/2017 1045     11/17/2017 1045    CO2 25 11/17/2017 1045    BUN 15 11/17/2017 1045    CREATININE 0.8 02/01/2019 1135    CREATININE 0.7 11/17/2017 1045        Component Value Date/Time    CALCIUM 9.1 02/01/2019 1135    ALKPHOS 50 11/17/2017 1045    AST 24 11/17/2017 1045    ALT 27 11/17/2017 1045    BILITOT 0.3 11/17/2017 1045        Lab Results   Component Value Date    ALT 27 11/17/2017    AST 24 11/17/2017    ALKPHOS 50 11/17/2017    BILITOT 0.3 11/17/2017       Immunofixation on June 22, 2016 showed:   The monoclonal protein peak accounts for 3.17 g/dL of the total 3.34 g/dL of protein in the gamma region. Immunofixation on December 5, 2016 showed: The monoclonal protein peak accounts for 2.91 g/dL of the total 3.00 g/dL of protein in the gamma region. Immunofixation on August 22, 2017 showed: The monoclonal protein peak accounts for 3.57 g/dL of the total 3.70 g/dL of protein in the gamma region. Immunofixation on February 1, 2018 showed: The monoclonal protein peak  accounts for 3.33 g/dL of the total 3.49 g/dL of protein in the gamma region. Immunofixation on 08/02/2018 showed:  M-spike in the gamma region.  The monoclonal protein peak   accounts for 3.22 g/dL of the total 3.38 g/dL of protein in   the gamma region. Immunofixation on 02/01/2019 showed:  M-spike in the gamma region.  The monoclonal protein peak   accounts for 3.45 g/dL of the total 3.62 g/dL of protein in   the gamma region.    Immunofixation on 08/05/2019 showed:  M-spike in the gamma region.  The monoclonal protein peak   accounts for 3.17 g/dL of the total 3.34 g/dL of protein in   the gamma region.     06/22/2016:  [place] Qnt Free Light Chains                   8.03 H   0.33-1.94     mg/dL   [place] Qnt Free Light Chains

## 2020-01-03 ENCOUNTER — OFFICE VISIT (OUTPATIENT)
Dept: FAMILY MEDICINE CLINIC | Age: 45
End: 2020-01-03
Payer: COMMERCIAL

## 2020-01-03 VITALS
SYSTOLIC BLOOD PRESSURE: 126 MMHG | HEART RATE: 80 BPM | HEIGHT: 63 IN | RESPIRATION RATE: 12 BRPM | WEIGHT: 189.6 LBS | BODY MASS INDEX: 33.59 KG/M2 | TEMPERATURE: 98 F | DIASTOLIC BLOOD PRESSURE: 81 MMHG

## 2020-01-03 PROCEDURE — 90688 IIV4 VACCINE SPLT 0.5 ML IM: CPT | Performed by: NURSE PRACTITIONER

## 2020-01-03 PROCEDURE — 99396 PREV VISIT EST AGE 40-64: CPT | Performed by: NURSE PRACTITIONER

## 2020-01-03 PROCEDURE — 90471 IMMUNIZATION ADMIN: CPT | Performed by: NURSE PRACTITIONER

## 2020-01-03 ASSESSMENT — ENCOUNTER SYMPTOMS
NAUSEA: 0
SINUS PRESSURE: 0
SORE THROAT: 0
APNEA: 0
COLOR CHANGE: 0
WHEEZING: 0
VOICE CHANGE: 0
CHEST TIGHTNESS: 0
DIARRHEA: 0
ABDOMINAL DISTENTION: 0
VOMITING: 0
SINUS PAIN: 0
RECTAL PAIN: 0
RHINORRHEA: 0
FACIAL SWELLING: 0
CHOKING: 0
PHOTOPHOBIA: 0
BACK PAIN: 0
EYE PAIN: 0
EYE ITCHING: 0
STRIDOR: 0
BLOOD IN STOOL: 0
ANAL BLEEDING: 0
EYE DISCHARGE: 0
SHORTNESS OF BREATH: 0
COUGH: 0
CONSTIPATION: 0
ABDOMINAL PAIN: 0
EYE REDNESS: 0
TROUBLE SWALLOWING: 0

## 2020-01-03 ASSESSMENT — PATIENT HEALTH QUESTIONNAIRE - PHQ9
1. LITTLE INTEREST OR PLEASURE IN DOING THINGS: 0
SUM OF ALL RESPONSES TO PHQ QUESTIONS 1-9: 0
SUM OF ALL RESPONSES TO PHQ QUESTIONS 1-9: 0
2. FEELING DOWN, DEPRESSED OR HOPELESS: 0
SUM OF ALL RESPONSES TO PHQ9 QUESTIONS 1 & 2: 0

## 2020-01-03 NOTE — PROGRESS NOTES
kg).    Physical Examination:   General appearance - alert, well appearing, and in no distress and oriented to person, place, and time  Eyes - pupils equal and reactive, extraocular eye movements intact, sclera anicteric  Ears - bilateral TM's and external ear canals normal, hearing grossly normal bilaterally  Nose - normal and patent, no erythema, discharge or polyps  Mouth - mucous membranes moist, pharynx normal without lesions  Neck - supple, no significant adenopathy  Lymphatics - no palpable lymphadenopathy  Chest - clear to auscultation, no wheezes, rales or rhonchi, symmetric air entry  Heart - normal rate, regular rhythm, normal S1, S2, no murmurs, rubs, clicks or gallops  Abdomen - soft, nontender, nondistended, no masses or organomegaly  Neurological - alert, oriented, normal speech, no focal findings or movement disorder noted  Extremities - peripheral pulses normal, no pedal edema, no clubbing or cyanosis  Skin - normal coloration and turgor, no rashes, no suspicious skin lesions noted    Assessment:   Diagnosis Orders   1. Routine general medical examination at health care facility  CBC Auto Differential    Comprehensive Metabolic Panel    TSH without Reflex    Urinalysis with Microscopic    Vitamin D 25 Hydroxy   2. Needs flu shot  INFLUENZA, QUADV, 3 YRS AND OLDER, IM, MDV, 0.5ML (Fredi Pinks)   3. Lipid screening  Lipid Panel       Plan:    Return in about 1 year (around 1/3/2021) for wellness. .    Counseling was provided today regarding the following topics:  Healthy eating habits, lifestyle modification,  vitamin/mineral supplementation, regular exercise, and testicular self exam.    Tisha Collazo, APRN - CNP

## 2020-01-03 NOTE — PATIENT INSTRUCTIONS
en inglés? Madelin Ayers a [email or web address] e ingrese a starr cuenta de MyChart. Luli Dodd R346 en el cuadro \"Search Health Information\" para más información (en inglés) sobre \"Visita de control para personas de 18 a 50 años: Instrucciones de cuidado - [ Well Visit, Ages 25 to 48: Care Instructions ]. \"     Si no tiene jake cuenta, beto nicole en el enlace \"Sign Up Now\". Revisado: 13 jorden, 2018  Versión del contenido: 12.1  © 3440-0727 [employer]. Las instrucciones de cuidado fueron adaptadas bajo licencia por [facility] ([facility]). Si usted tiene [place] afección médica o sobre estas instrucciones, siempre pregunte a starr profesional de dennis. [employer] niega toda garantía o responsabilidad por starr uso de esta información. Patient Education        Learning About Physical Activity  What is physical activity? Physical activity is any kind of activity that gets your body moving. The types of physical activity that can help you get fit and stay healthy include:  · Aerobic or \"cardio\" activities that make your heart beat faster and make you breathe harder, such as brisk walking, riding a bike, or running. Aerobic activities strengthen your heart and lungs and build up your endurance. · Strength training activities that make your muscles work against, or \"resist,\" something, such as lifting weights or doing push-ups. These activities help tone and strengthen your muscles. · Stretches that allow you to move your joints and muscles through their full range of motion. Stretching helps you be more flexible and avoid injury. What are the benefits of physical activity? Being active is one of the best things you can do to get fit and stay healthy. It helps you to:  · Feel stronger and have more energy to do all the things you like to do. · Focus better at school or work and perform better in sports. · Feel, think, and sleep better.   · Reach and stay at a healthy weight. · Lose fat and build lean muscle. · Lower your risk for serious health problems. · Keep your bones, muscles, and joints strong. Being fit lets you do more physical activity. And it lets you work out harder without as much effort. How can you make physical activity part of your life? Get at least 30 minutes of exercise on most days of the week. Walking is a good choice. You also may want to do other activities, such as running, swimming, cycling, or playing tennis or team sports. Pick activities that you like--ones that make your heart beat faster, your muscles stronger, and your muscles and joints more flexible. If you find more than one thing you like doing, do them all. You don't have to do the same thing every day. Get your heart pumping every day. Any activity that makes your heart beat faster and keeps it at that rate for a while counts. Here are some great ways to get your heart beating faster:  · Go for a brisk walk, run, or bike ride. · Go for a hike or swim. · Go in-line skating. · Play a game of touch football, basketball, or soccer. · Ride a bike. · Play tennis or racquetball. · Climb stairs. Even some household chores can be aerobic--just do them at a faster pace. Vacuuming, raking or mowing the lawn, sweeping the garage, and washing and waxing the car all can help get your heart rate up. Strengthen your muscles during the week. You don't have to lift heavy weights or grow big, bulky muscles to get stronger. Doing a few simple activities that make your muscles work against, or \"resist,\" something can help you get stronger. For example, you can:  · Do push-ups or sit-ups, which use your own body weight as resistance. · Lift weights or dumbbells or use stretch bands at home or in a gym or community center. Stretch your muscles often. Stretching will help you as you become more active. It can help you stay flexible, loosen tight muscles, and avoid injury.  It can also help improve What does the dish feel like? Is the water warm or cold? · Go outside and take a few deep breaths. What is the air like? Is it warm or cold? · When you can, take some time at the start of your day to sit alone and think. · Take a slow walk by yourself. Count your steps while you breathe in and out. · Try yoga breathing exercises, stretches, and poses to strengthen and relax your muscles. · At work, if you can, try to stop for a few moments each hour. Note how your body feels. Let yourself regroup and let your mind settle before you return to what you were doing. · If you struggle with anxiety or \"worry thoughts,\" imagine your mind as a blue marsha and your worry thoughts as clouds. Now imagine those worry thoughts floating across your mind's marsha. Just let them pass by as you watch. Follow-up care is a key part of your treatment and safety. Be sure to make and go to all appointments, and call your doctor if you are having problems. It's also a good idea to know your test results and keep a list of the medicines you take. Where can you learn more? Go to https://LocalBanyapepicew[email or web address]. org and sign in to your [employer] account. Enter A214 in the [employer] box to learn more about \"Learning About Mindfulness for Stress. \"     If you do not have an account, please click on the \"Sign Up Now\" link. Current as of: June 28, 2018  Content Version: 12.1  © 9852-6239 Healthwise, Incorporated. Care instructions adapted under license by [facility] ([facility]). If you have questions about a medical condition or this instruction, always ask your healthcare professional. [address] any warranty or liability for your use of this information.

## 2020-02-06 ENCOUNTER — HOSPITAL ENCOUNTER (OUTPATIENT)
Age: 45
Discharge: HOME | End: 2020-02-06
Payer: COMMERCIAL

## 2020-02-06 LAB
ALBUMIN SERPL-MCNC: 4.1 G/DL (ref 3.5–5.1)
ALP BLD-CCNC: 59 U/L (ref 38–126)
ALT SERPL-CCNC: 32 U/L (ref 11–66)
ANION GAP SERPL CALCULATED.3IONS-SCNC: 11 MEQ/L (ref 8–16)
AST SERPL-CCNC: 28 U/L (ref 5–40)
BACTERIA: NORMAL
BASOPHILS # BLD: 0.5 %
BASOPHILS ABSOLUTE: 0 THOU/MM3 (ref 0–0.1)
BILIRUB SERPL-MCNC: 0.3 MG/DL (ref 0.3–1.2)
BILIRUBIN URINE: NEGATIVE
BLOOD, URINE: NEGATIVE
BUN BLDV-MCNC: 9 MG/DL (ref 7–22)
CALCIUM SERPL-MCNC: 9.5 MG/DL (ref 8.5–10.5)
CASTS: NORMAL /LPF
CASTS: NORMAL /LPF
CHARACTER, URINE: CLEAR
CHLORIDE BLD-SCNC: 102 MEQ/L (ref 98–111)
CHOLESTEROL, TOTAL: 201 MG/DL (ref 100–199)
CO2: 24 MEQ/L (ref 23–33)
COLOR: YELLOW
CREAT SERPL-MCNC: 0.8 MG/DL (ref 0.4–1.2)
CRYSTALS: NORMAL
EOSINOPHIL # BLD: 2.6 %
EOSINOPHILS ABSOLUTE: 0.2 THOU/MM3 (ref 0–0.4)
EPITHELIAL CELLS, UA: NORMAL /HPF
ERYTHROCYTE [DISTWIDTH] IN BLOOD BY AUTOMATED COUNT: 13.9 % (ref 11.5–14.5)
ERYTHROCYTE [DISTWIDTH] IN BLOOD BY AUTOMATED COUNT: 45.6 FL (ref 35–45)
GFR SERPL CREATININE-BSD FRML MDRD: > 90 ML/MIN/1.73M2
GLUCOSE BLD-MCNC: 92 MG/DL (ref 70–108)
GLUCOSE, URINE: NEGATIVE MG/DL
HCT VFR BLD CALC: 44.4 % (ref 42–52)
HDLC SERPL-MCNC: 54 MG/DL
HEMOGLOBIN: 13.9 GM/DL (ref 14–18)
IMMATURE GRANS (ABS): 0.03 THOU/MM3 (ref 0–0.07)
IMMATURE GRANULOCYTES: 0.4 %
KETONES, URINE: NEGATIVE
LDL CHOLESTEROL CALCULATED: 129 MG/DL
LEUKOCYTE ESTERASE, URINE: NEGATIVE
LYMPHOCYTES # BLD: 31.1 %
LYMPHOCYTES ABSOLUTE: 2.4 THOU/MM3 (ref 1–4.8)
MCH RBC QN AUTO: 28.1 PG (ref 26–33)
MCHC RBC AUTO-ENTMCNC: 31.3 GM/DL (ref 32.2–35.5)
MCV RBC AUTO: 89.9 FL (ref 80–94)
MISCELLANEOUS LAB TEST RESULT: NORMAL
MONOCYTES # BLD: 12 %
MONOCYTES ABSOLUTE: 0.9 THOU/MM3 (ref 0.4–1.3)
NITRITE, URINE: NEGATIVE
NUCLEATED RED BLOOD CELLS: 0 /100 WBC
PH UA: 6 (ref 5–9)
PLATELET # BLD: 256 THOU/MM3 (ref 130–400)
PMV BLD AUTO: 11.1 FL (ref 9.4–12.4)
POTASSIUM SERPL-SCNC: 4.5 MEQ/L (ref 3.5–5.2)
PROTEIN UA: NEGATIVE MG/DL
RBC # BLD: 4.94 MILL/MM3 (ref 4.7–6.1)
RBC URINE: NORMAL /HPF
RENAL EPITHELIAL, UA: NORMAL
SEG NEUTROPHILS: 53.4 %
SEGMENTED NEUTROPHILS ABSOLUTE COUNT: 4.1 THOU/MM3 (ref 1.8–7.7)
SODIUM BLD-SCNC: 137 MEQ/L (ref 135–145)
SPECIFIC GRAVITY UA: 1.02 (ref 1–1.03)
TOTAL PROTEIN: 10 G/DL (ref 6.1–8)
TRIGL SERPL-MCNC: 91 MG/DL (ref 0–199)
TSH SERPL DL<=0.05 MIU/L-ACNC: 1.44 UIU/ML (ref 0.4–4.2)
UROBILINOGEN, URINE: 0.2 EU/DL (ref 0–1)
VITAMIN D 25-HYDROXY: 17 NG/ML (ref 30–100)
WBC # BLD: 7.6 THOU/MM3 (ref 4.8–10.8)
WBC UA: NORMAL /HPF
YEAST: NORMAL

## 2020-02-06 PROCEDURE — 85025 COMPLETE CBC W/AUTO DIFF WBC: CPT

## 2020-02-06 PROCEDURE — 84155 ASSAY OF PROTEIN SERUM: CPT

## 2020-02-06 PROCEDURE — 80053 COMPREHEN METABOLIC PANEL: CPT

## 2020-02-06 PROCEDURE — 84443 ASSAY THYROID STIM HORMONE: CPT

## 2020-02-06 PROCEDURE — 82784 ASSAY IGA/IGD/IGG/IGM EACH: CPT

## 2020-02-06 PROCEDURE — 86334 IMMUNOFIX E-PHORESIS SERUM: CPT

## 2020-02-06 PROCEDURE — 80061 LIPID PANEL: CPT

## 2020-02-06 PROCEDURE — 81001 URINALYSIS AUTO W/SCOPE: CPT

## 2020-02-06 PROCEDURE — 36415 COLL VENOUS BLD VENIPUNCTURE: CPT

## 2020-02-06 PROCEDURE — 83883 ASSAY NEPHELOMETRY NOT SPEC: CPT

## 2020-02-06 PROCEDURE — 84165 PROTEIN E-PHORESIS SERUM: CPT

## 2020-02-06 PROCEDURE — 82306 VITAMIN D 25 HYDROXY: CPT

## 2020-02-09 LAB — KAPPA/LAMBDA FREE LIGHT CHAINS: NORMAL

## 2020-02-10 ENCOUNTER — TELEPHONE (OUTPATIENT)
Dept: FAMILY MEDICINE CLINIC | Age: 45
End: 2020-02-10

## 2020-02-14 LAB — IMMUNOFIXATION ELECTROPHORESIS: NORMAL

## 2020-03-06 ENCOUNTER — OFFICE VISIT (OUTPATIENT)
Dept: ONCOLOGY | Age: 45
End: 2020-03-06
Payer: COMMERCIAL

## 2020-03-06 ENCOUNTER — HOSPITAL ENCOUNTER (OUTPATIENT)
Dept: INFUSION THERAPY | Age: 45
Discharge: HOME | End: 2020-03-06
Payer: COMMERCIAL

## 2020-03-06 VITALS
HEART RATE: 71 BPM | HEIGHT: 63 IN | TEMPERATURE: 97.8 F | DIASTOLIC BLOOD PRESSURE: 80 MMHG | RESPIRATION RATE: 18 BRPM | BODY MASS INDEX: 33.27 KG/M2 | WEIGHT: 187.8 LBS | OXYGEN SATURATION: 99 % | SYSTOLIC BLOOD PRESSURE: 122 MMHG

## 2020-03-06 PROCEDURE — 99214 OFFICE O/P EST MOD 30 MIN: CPT | Performed by: INTERNAL MEDICINE

## 2020-03-06 PROCEDURE — 99211 OFF/OP EST MAY X REQ PHY/QHP: CPT

## 2020-03-06 RX ORDER — OMEGA-3S/DHA/EPA/FISH OIL/D3 300MG-1000
1000 CAPSULE ORAL DAILY
COMMUNITY
End: 2020-12-23

## 2020-03-06 ASSESSMENT — ENCOUNTER SYMPTOMS
WHEEZING: 0
ABDOMINAL PAIN: 0
DIARRHEA: 0
TROUBLE SWALLOWING: 0
SORE THROAT: 0
CHEST TIGHTNESS: 0
BLOOD IN STOOL: 0
VOMITING: 0
RECTAL PAIN: 0
EYE DISCHARGE: 0
BACK PAIN: 0
CONSTIPATION: 0
ABDOMINAL DISTENTION: 0
COLOR CHANGE: 0
COUGH: 0
NAUSEA: 0
SHORTNESS OF BREATH: 0
FACIAL SWELLING: 0

## 2020-03-06 NOTE — PROGRESS NOTES
3.17 g/dL of the total 3.34 g/dL of protein in   the gamma region. Immunofixation on 02/06/2020 showed:  M-spike in the gamma region.  The monoclonal protein peak   accounts for 3.29 g/dL of the total 3.43 g/dL of protein in   the gamma region. 06/22/2016:  Kappa Qnt Free Light Chains                   8.03 H   0.33-1.94     mg/dL   Lambda Qnt Free Light Chains                  0.77     0.57-2.63     mg/dL   Kappa/Lambda Free Light Chain Ratio          10.43 H   0.26-1.65    12/05/2016:  Kappa Qnt Free Light Chains                   9.99 H   0.33-1.94     mg/dL   Lambda Qnt Free Light Chains                  0.85     0.57-2.63     mg/dL   Kappa/Lambda Free Light Chain Ratio          11.75 H   0.26-1.65    02/01/2018:  Kappa Qnt Free Light Chains                   9.66 H   0.33-1.94     mg/dL   Lambda Qnt Free Light Chains                  0.73     0.57-2.63     mg/dL   Kappa/Lambda Free Light Chain Ratio          13.23 H   0.26-1.65     08/02/2018:  [place] Qnt Free Light Chains                   8.90 H   0.33-1.94     mg/dL   Lambda Qnt Free Light Chains                  0.65     0.57-2.63     mg/dL   Kappa/Lambda Free Light Chain Ratio          13.69 H   0.26-1.65     02/01/2019:  [place] Qnt Free Light Chains                   6.95    Lambda Qnt Free Light Chains                  0.58      [place]/Lambda Free Light Chain Ratio          11.98     08/05/2019:  [place] Qnt Free Light Chains                   6.25    Lambda Qnt Free Light Chains                  0.61      [place]/Lambda Free Light Chain Ratio     10.25      02/06/2020  [place] Qnt Free Light Chains                  15.90    Lambda Qnt Free Light Chains                0.72        [place]/Lambda Free Light Chain Ratio    22.08 H        Bone marrow biopsy on 09/23/2016 showed:      Normocellular bone marrow (60%) with orderly trilineage      hematopoiesis and increased mature plasma cells (17%).       Kappa restricted plasma cells were demonstrated by flow cytometric      analysis. Morphologic and ancillary studies are consistent with a plasma cell      neoplasm (dyscrasia). FISH results were NORMAL    Assessment/Plan:        Diagnosis Orders   1. Smoldering multiple myeloma (HCC)  Immunofixation serum profile    Immunofixation, urine    Immunofixation w/ Quant    POC PANEL BMP W/IOCA    CBC    Absolute Neutrophil    Kappa/Lambda Quant Free Light Chains Serum        1. 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 He underwent immunofixation electrophoresis that showed M-spike in the gamma region. The monoclonal protein peak accounted for 3.17 g/dL of the total 3.34 g/dL of protein in the gamma region. Bone marrow biopsy showed increased mature plasma cells (17%). No suspicious osseous lesions were identified on the bone survey. Patient continues to be asymptomatic, he denies having any B symptoms, no bone pain, no peripheral neuropathy. His most recent M protein in February 2020 is sable, however his kappa free light chain is trending up, [place]/Lambda Free Light Chain Ratio is 22.08. His hemoglobin, creatinine and calcium are still within normal limits             SMM is defined as an M-protein ?3 g/dL and/or 10 to 60% bone marrow plasma cells but no end-organ damage (lytic lesions, anemia, renal disease, or hypercalcemia) that can be attributed to the underlying plasma cell disorder or other myeloma defining events, and no amyloidosis. The patient meets criteria for smoldering multiple myeloma based on M protein >3 g/dl and 17% bone marrow plasma cells. Factors associated with an increased risk of progression to overt MM are an abnormal free light chain ratio ( >8.0), bone marrow plasma cells ? 10 percent, and a serum M protein ?3 g/dL. In addition, genetic features may impact prognosis, time to progression and overall survival were significantly shorter in patients with t(4;14). Patient's cytogenetics and FISH were normal.  We will continue with close monitoring with deferral of chemotherapy until progression to symptomatic disease. There is absence of clear benefit with currently available therapies, and some patients with SMM can remain stable without treatment over extended periods of time. The patient  will return to clinic in 6 months. On return to clinic visit he will have CBC, BMP, blood immunofixation, Kappa/lambda free light chains. All patient questions answered. Patient agreed with treatment plan. Follow up as directed.

## 2020-03-06 NOTE — PATIENT INSTRUCTIONS
1.  RTC on September 2, 2020.  2-3 weeks before RTC lab work: CBC, BMP, multiple myeloma panel, kappa lambda free light chain

## 2020-08-21 ENCOUNTER — HOSPITAL ENCOUNTER (OUTPATIENT)
Age: 45
Discharge: HOME | End: 2020-08-21
Payer: COMMERCIAL

## 2020-08-21 DIAGNOSIS — D47.2 SMOLDERING MULTIPLE MYELOMA: ICD-10-CM

## 2020-08-21 LAB
ANION GAP SERPL CALCULATED.3IONS-SCNC: 6 MEQ/L (ref 8–16)
BUN BLDV-MCNC: 11 MG/DL (ref 7–22)
CALCIUM SERPL-MCNC: 9 MG/DL (ref 8.5–10.5)
CHLORIDE BLD-SCNC: 104 MEQ/L (ref 98–111)
CO2: 24 MEQ/L (ref 23–33)
CREAT SERPL-MCNC: 0.7 MG/DL (ref 0.4–1.2)
ERYTHROCYTE [DISTWIDTH] IN BLOOD BY AUTOMATED COUNT: 13.9 % (ref 11.5–14.5)
ERYTHROCYTE [DISTWIDTH] IN BLOOD BY AUTOMATED COUNT: 46.4 FL (ref 35–45)
GFR SERPL CREATININE-BSD FRML MDRD: > 90 ML/MIN/1.73M2
GLUCOSE BLD-MCNC: 107 MG/DL (ref 70–108)
HCT VFR BLD CALC: 45.1 % (ref 42–52)
HEMOGLOBIN: 14.1 GM/DL (ref 14–18)
MCH RBC QN AUTO: 28.5 PG (ref 26–33)
MCHC RBC AUTO-ENTMCNC: 31.3 GM/DL (ref 32.2–35.5)
MCV RBC AUTO: 91.3 FL (ref 80–94)
PLATELET # BLD: 246 THOU/MM3 (ref 130–400)
PMV BLD AUTO: 11.7 FL (ref 9.4–12.4)
POTASSIUM SERPL-SCNC: 4.2 MEQ/L (ref 3.5–5.2)
RBC # BLD: 4.94 MILL/MM3 (ref 4.7–6.1)
SEGMENTED NEUTROPHILS ABSOLUTE COUNT: 2.8 THOU/MM3 (ref 1.8–7.7)
SODIUM BLD-SCNC: 134 MEQ/L (ref 135–145)
WBC # BLD: 6.4 THOU/MM3 (ref 4.8–10.8)

## 2020-08-21 PROCEDURE — 84165 PROTEIN E-PHORESIS SERUM: CPT

## 2020-08-21 PROCEDURE — 36415 COLL VENOUS BLD VENIPUNCTURE: CPT

## 2020-08-21 PROCEDURE — 84155 ASSAY OF PROTEIN SERUM: CPT

## 2020-08-21 PROCEDURE — 85027 COMPLETE CBC AUTOMATED: CPT

## 2020-08-21 PROCEDURE — 86334 IMMUNOFIX E-PHORESIS SERUM: CPT

## 2020-08-21 PROCEDURE — 86335 IMMUNFIX E-PHORSIS/URINE/CSF: CPT

## 2020-08-21 PROCEDURE — 80048 BASIC METABOLIC PNL TOTAL CA: CPT

## 2020-08-21 PROCEDURE — 84156 ASSAY OF PROTEIN URINE: CPT

## 2020-08-21 PROCEDURE — 82784 ASSAY IGA/IGD/IGG/IGM EACH: CPT

## 2020-08-21 PROCEDURE — 83883 ASSAY NEPHELOMETRY NOT SPEC: CPT

## 2020-08-24 LAB
IMMUNOFIXATION URINE: NORMAL
KAPPA/LAMBDA FREE LIGHT CHAINS: NORMAL

## 2020-08-25 LAB — IMMUNOFIXATION WITH QUANT: NORMAL

## 2020-09-03 ASSESSMENT — ENCOUNTER SYMPTOMS
SORE THROAT: 0
COLOR CHANGE: 0
ABDOMINAL DISTENTION: 0
FACIAL SWELLING: 0
RECTAL PAIN: 0
EYE DISCHARGE: 0
WHEEZING: 0
SHORTNESS OF BREATH: 0
CHEST TIGHTNESS: 0
TROUBLE SWALLOWING: 0
VOMITING: 0
DIARRHEA: 0
BLOOD IN STOOL: 0
CONSTIPATION: 0
NAUSEA: 0
BACK PAIN: 0
COUGH: 0
ABDOMINAL PAIN: 0

## 2020-09-04 ENCOUNTER — HOSPITAL ENCOUNTER (OUTPATIENT)
Dept: INFUSION THERAPY | Age: 45
Discharge: HOME | End: 2020-09-04
Payer: COMMERCIAL

## 2020-09-04 ENCOUNTER — OFFICE VISIT (OUTPATIENT)
Dept: ONCOLOGY | Age: 45
End: 2020-09-04
Payer: COMMERCIAL

## 2020-09-04 VITALS
HEART RATE: 67 BPM | OXYGEN SATURATION: 99 % | RESPIRATION RATE: 18 BRPM | SYSTOLIC BLOOD PRESSURE: 133 MMHG | BODY MASS INDEX: 33.27 KG/M2 | TEMPERATURE: 97 F | DIASTOLIC BLOOD PRESSURE: 88 MMHG | HEIGHT: 63 IN

## 2020-09-04 PROCEDURE — 99211 OFF/OP EST MAY X REQ PHY/QHP: CPT

## 2020-09-04 PROCEDURE — 99214 OFFICE O/P EST MOD 30 MIN: CPT | Performed by: INTERNAL MEDICINE

## 2020-09-04 NOTE — PROGRESS NOTES
[facility] PROFESSIONAL SERVS  [facility]  [address]  [address]  [place] [address]  Dept: [phone]  Dept Fax: [phone]: [phone]     Visit Date: 9/4/2020     Paul Brooks is a 40 y.o. male who presents today for:   Chief Complaint   Patient presents with    Follow-up       Smoldering multiple myeloma *Labs Prior        HPI     Sole Escoto is a 40 y.o. male with smoldering multiple myeloma. When he applied for life insurance he was denied because his total protein and globulin were elevated on screening lab work. Therefore he underwent further testing in June 2016. The immunofixation electrophoresis showed M-spike in the gamma region. The monoclonal protein peak accounted for 3.17 g/dL of the total 3.34 g/dL of protein in  the gamma region.  PAU pattern showed an IgG type kappa monoclonal protein. The patient had a bone marrow biopsy, cytogenetic analysis and bone survey. Bone marrow biopsy showed increased mature plasma cells (17%). No suspicious osseous lesions were identified on the bone survey. His labs met criteria for SMM ( an M-protein ?3 g/dL, 17% bone marrow plasma cells and no end-organ damage). The patient is currently followed by surveillance, no active treatment    Interim history on 09/04/2020:  Patient presents to the medical oncology clinic for 6 months follow-up visit. Today, he remains asymptomatic, he denies having any B symptoms, no bone pain, no peripheral neuropathy. He denies having any fevers, no recent infections. He continues to maintain stable weight with diet and exercise. He denies being placed on any new medications since last visit.   No visits to the emergency room, no admissions to the hospital.    Family History   Problem Relation Age of Onset    High Blood Pressure Mother     Other Mother         osteoporosis    Asthma Father     High Blood Pressure Father       Social History     Tobacco Use    Smoking status: Former Smoker Resp 18   Ht 5' 3\" (1.6 m)   SpO2 99%   BMI 33.27 kg/m²      Imaging studies and labs:   Bone survey on 09/25/2016 showed:  1. No suspicious osseous lesions are identified on the bone survey. Lab Results   Component Value Date    WBC 6.4 08/21/2020    HGB 14.1 08/21/2020    HCT 45.1 08/21/2020    MCV 91.3 08/21/2020     08/21/2020       Chemistry        Component Value Date/Time     (L) 08/21/2020 1243    K 4.2 08/21/2020 1243     08/21/2020 1243    CO2 24 08/21/2020 1243    BUN 11 08/21/2020 1243    CREATININE 0.7 08/21/2020 1243        Component Value Date/Time    CALCIUM 9.0 08/21/2020 1243    ALKPHOS 59 02/06/2020 0915    AST 28 02/06/2020 0915    ALT 32 02/06/2020 0915    BILITOT 0.3 02/06/2020 0915        Lab Results   Component Value Date    ALT 32 02/06/2020    AST 28 02/06/2020    ALKPHOS 59 02/06/2020    BILITOT 0.3 02/06/2020       Immunofixation on June 22, 2016 showed:   The monoclonal protein peak accounts for 3.17 g/dL of the total 3.34 g/dL of protein in the gamma region. Immunofixation on December 5, 2016 showed: The monoclonal protein peak accounts for 2.91 g/dL of the total 3.00 g/dL of protein in the gamma region. Immunofixation on August 22, 2017 showed: The monoclonal protein peak accounts for 3.57 g/dL of the total 3.70 g/dL of protein in the gamma region. Immunofixation on February 1, 2018 showed: The monoclonal protein peak  accounts for 3.33 g/dL of the total 3.49 g/dL of protein in the gamma region. Immunofixation on 08/02/2018 showed:  M-spike in the gamma region.  The monoclonal protein peak   accounts for 3.22 g/dL of the total 3.38 g/dL of protein in   the gamma region.    Immunofixation on 02/01/2019 showed:  M-spike in the gamma region.  The monoclonal protein peak   accounts for 3.45 g/dL of the total 3.62 g/dL of protein in   the gamma region.    Immunofixation on 08/05/2019 showed:  M-spike in the gamma region.  The monoclonal protein peak accounts for 3.17 g/dL of the total 3.34 g/dL of protein in   the gamma region. Immunofixation on 02/06/2020 showed:  M-spike in the gamma region.  The monoclonal protein peak   accounts for 3.29 g/dL of the total 3.43 g/dL of protein in   the gamma region. Immunofixation on 08/21/2020 showed:  M-spike in the gamma region.  The monoclonal protein peak accounts for 3.36 g/dL of the total 3.48 g/dL of protein in   the gamma region.     06/22/2016:  [place] Qnt Free Light Chains                   8.03 H   0.33-1.94     mg/dL   Lambda Qnt Free Light Chains                  0.77     0.57-2.63     mg/dL   Kappa/Lambda Free Light Chain Ratio          10.43 H   0.26-1.65    12/05/2016:  Kappa Qnt Free Light Chains                   9.99 H   0.33-1.94     mg/dL   Lambda Qnt Free Light Chains                  0.85     0.57-2.63     mg/dL   Kappa/Lambda Free Light Chain Ratio          11.75 H   0.26-1.65    02/01/2018:  Kappa Qnt Free Light Chains                   9.66 H   0.33-1.94     mg/dL   Lambda Qnt Free Light Chains                  0.73     0.57-2.63     mg/dL   Kappa/Lambda Free Light Chain Ratio          13.23 H   0.26-1.65     08/02/2018:  [place] Qnt Free Light Chains                   8.90 H   0.33-1.94     mg/dL   Lambda Qnt Free Light Chains                  0.65     0.57-2.63     mg/dL   Kappa/Lambda Free Light Chain Ratio          13.69 H   0.26-1.65     02/01/2019:  [place] Qnt Free Light Chains                   6.95    Lambda Qnt Free Light Chains                  0.58      [place]/Lambda Free Light Chain Ratio          11.98     08/05/2019:  [place] Qnt Free Light Chains                   6.25    Lambda Qnt Free Light Chains                  0.61      [place]/Lambda Free Light Chain Ratio     10.25      02/06/2020  [place] Qnt Free Light Chains                  15.90    Lambda Qnt Free Light Chains                0.72        [place]/Lambda Free Light Chain Ratio    22.08 H       August 21, 2020:  [place] Qnt Free Light Chains                    64.30 H      Lambda Qnt Free Light Chains                  6.36       [place]/Lambda Free Light Chain Ratio       10.11 H        Bone marrow biopsy on 09/23/2016 showed:      Normocellular bone marrow (60%) with orderly trilineage      hematopoiesis and increased mature plasma cells (17%).     Kappa restricted plasma cells were demonstrated by flow cytometric      analysis. Morphologic and ancillary studies are consistent with a plasma cell      neoplasm (dyscrasia). FISH results were NORMAL        Assessment/Plan:        Diagnosis Orders   1. Smoldering multiple myeloma ([address]  NM BONE SCAN WHOLE BODY        1. Smoldering Multiple Myeloma (SMM):  This is a 55-year-old male with smoldering multiple myeloma diagnosed in June 2016. He underwent immunofixation electrophoresis that showed M-spike in the gamma region. The monoclonal protein peak accounted for 3.17 g/dL of the total 3.34 g/dL of protein in the gamma region. Bone marrow biopsy showed increased mature plasma cells (17%). No suspicious osseous lesions were identified on the bone survey. Patient continues to be asymptomatic, he denies having any B symptoms, no bone pain, no peripheral neuropathy. His most recent M protein in February 2020 is sable, however his kappa free light chain is trending up, [place]/Lambda Free Light Chain Ratio is 22.08. His hemoglobin, creatinine and calcium are still within normal limits             SMM is defined as an M-protein ?3 g/dL and/or 10 to 60% bone marrow plasma cells but no end-organ damage (lytic lesions, anemia, renal disease, or hypercalcemia) that can be attributed to the underlying plasma cell disorder or other myeloma defining events, and no amyloidosis. The patient meets criteria for smoldering multiple myeloma based on M protein >3 g/dl and 17% bone marrow plasma cells.  Factors associated with an increased risk of progression to overt MM are an abnormal free light chain ratio ( >8.0), bone marrow plasma cells ? 10 percent, and a serum M protein ?3 g/dL. In addition, genetic features may impact prognosis, time to progression and overall survival were significantly shorter in patients with t(4;14). Patient's cytogenetics and FISH were normal.  The patient remains asymptomatic. However his kappa free light chain has increased to  64.30. Therefore the patient will have a bone survey. All patient questions answered. Patient agreed with treatment plan. Follow up as directed.

## 2020-09-04 NOTE — PATIENT INSTRUCTIONS
1.  Bone scan the next week  2.   Louise Borrego will call the patient with report on bone scan and will make decision about next step

## 2020-09-15 ENCOUNTER — HOSPITAL ENCOUNTER (OUTPATIENT)
Dept: NUCLEAR MEDICINE | Age: 45
Discharge: HOME | End: 2020-09-15
Payer: COMMERCIAL

## 2020-09-15 PROCEDURE — 78306 BONE IMAGING WHOLE BODY: CPT

## 2020-09-15 PROCEDURE — A9503 TC99M MEDRONATE: HCPCS | Performed by: INTERNAL MEDICINE

## 2020-09-15 PROCEDURE — 3430000000 HC RX DIAGNOSTIC RADIOPHARMACEUTICAL: Performed by: INTERNAL MEDICINE

## 2020-09-15 RX ORDER — TC 99M MEDRONATE 20 MG/10ML
25 INJECTION, POWDER, LYOPHILIZED, FOR SOLUTION INTRAVENOUS
Status: COMPLETED | OUTPATIENT
Start: 2020-09-15 | End: 2020-09-15

## 2020-09-15 RX ADMIN — TC 99M MEDRONATE 25 MILLICURIE: 20 INJECTION, POWDER, LYOPHILIZED, FOR SOLUTION INTRAVENOUS at 09:05

## 2020-09-22 ENCOUNTER — TELEPHONE (OUTPATIENT)
Dept: ONCOLOGY | Age: 45
End: 2020-09-22

## 2020-09-24 NOTE — TELEPHONE ENCOUNTER
Chan talked to the patient. His bone survey was negative.   We will repeat her multiple myeloma work-up in 3 months

## 2020-12-08 ENCOUNTER — HOSPITAL ENCOUNTER (OUTPATIENT)
Age: 45
Discharge: HOME | End: 2020-12-08
Payer: COMMERCIAL

## 2020-12-08 DIAGNOSIS — D47.2 SMOLDERING MULTIPLE MYELOMA: ICD-10-CM

## 2020-12-08 PROCEDURE — 84165 PROTEIN E-PHORESIS SERUM: CPT

## 2020-12-08 PROCEDURE — 83520 IMMUNOASSAY QUANT NOS NONAB: CPT

## 2020-12-08 PROCEDURE — 86335 IMMUNFIX E-PHORSIS/URINE/CSF: CPT

## 2020-12-08 PROCEDURE — 84156 ASSAY OF PROTEIN URINE: CPT

## 2020-12-08 PROCEDURE — 86334 IMMUNOFIX E-PHORESIS SERUM: CPT

## 2020-12-08 PROCEDURE — 82784 ASSAY IGA/IGD/IGG/IGM EACH: CPT

## 2020-12-08 PROCEDURE — 83883 ASSAY NEPHELOMETRY NOT SPEC: CPT

## 2020-12-08 PROCEDURE — 36415 COLL VENOUS BLD VENIPUNCTURE: CPT

## 2020-12-08 PROCEDURE — 84155 ASSAY OF PROTEIN SERUM: CPT

## 2020-12-11 LAB
IMMUNOFIXATION URINE: NORMAL
KAPPA/LAMBDA FREE LIGHT CHAINS: NORMAL

## 2020-12-12 LAB — MISC. #1 REFERENCE GROUP TEST: ABNORMAL

## 2020-12-13 LAB — PROTEIN ELECTROPHORESIS, SERUM: NORMAL

## 2020-12-18 ENCOUNTER — OFFICE VISIT (OUTPATIENT)
Dept: ONCOLOGY | Age: 45
End: 2020-12-18
Payer: COMMERCIAL

## 2020-12-18 ENCOUNTER — HOSPITAL ENCOUNTER (OUTPATIENT)
Dept: INFUSION THERAPY | Age: 45
Discharge: HOME | End: 2020-12-18
Payer: COMMERCIAL

## 2020-12-18 VITALS
RESPIRATION RATE: 18 BRPM | DIASTOLIC BLOOD PRESSURE: 89 MMHG | HEIGHT: 63 IN | BODY MASS INDEX: 33.84 KG/M2 | WEIGHT: 191 LBS | TEMPERATURE: 97.6 F | SYSTOLIC BLOOD PRESSURE: 140 MMHG | OXYGEN SATURATION: 98 % | HEART RATE: 77 BPM

## 2020-12-18 PROCEDURE — 99214 OFFICE O/P EST MOD 30 MIN: CPT | Performed by: INTERNAL MEDICINE

## 2020-12-18 PROCEDURE — 99211 OFF/OP EST MAY X REQ PHY/QHP: CPT

## 2020-12-18 ASSESSMENT — ENCOUNTER SYMPTOMS
SORE THROAT: 0
CHEST TIGHTNESS: 0
COUGH: 0
BLOOD IN STOOL: 0
VOMITING: 0
WHEEZING: 0
CONSTIPATION: 0
COLOR CHANGE: 0
FACIAL SWELLING: 0
RECTAL PAIN: 0
BACK PAIN: 0
ABDOMINAL DISTENTION: 0
EYE DISCHARGE: 0
ABDOMINAL PAIN: 0
NAUSEA: 0
TROUBLE SWALLOWING: 0
SHORTNESS OF BREATH: 0
DIARRHEA: 0

## 2020-12-18 NOTE — PROGRESS NOTES
[facility] PROFESSIONAL SERVS  [facility] OF [facility]  [address] [facility] [address]  [address] [facility] [address]  [address] [address]  Dept: [phone]  Dept Fax: [phone]: [phone]     Visit Date: 12/18/2020     Tammy Rodriguez is a 39 y.o. 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 When he applied for life insurance he was denied because his total protein and globulin were elevated on screening lab work. Therefore he underwent further testing in June 2016. The immunofixation electrophoresis showed M-spike in the gamma region. The monoclonal protein peak accounted for 3.17 g/dL of the total 3.34 g/dL of protein in  the gamma region.  PAU pattern showed an IgG type kappa monoclonal protein. The patient had a bone marrow biopsy, cytogenetic analysis and bone survey. Bone marrow biopsy showed increased mature plasma cells (17%). No suspicious osseous lesions were identified on the bone survey. His labs met criteria for SMM ( an M-protein ?3 g/dL, 17% bone marrow plasma cells and no end-organ damage). The patient is currently followed by surveillance, no active treatment    Interim history on 12/18/2020:  Patient presents to the medical oncology clinic for3 months follow-up visit. Today, he remains asymptomatic, he denies having any B symptoms, no bone pain, no peripheral neuropathy. He denies having any fevers, no recent infections. He continues to maintain stable weight with diet and exercise. He denies being placed on any new medications since last visit.   No visits to the emergency room, no admissions to the hospital.    Family History   Problem Relation Age of Onset    High Blood Pressure Mother     Other Mother         osteoporosis    Asthma Father     High Blood Pressure Father       Social History     Tobacco Use    Smoking status: Former Smoker Packs/day: 0.50     Years: 10.00     Pack years: 5.00     Types: Cigarettes     Quit date: 2008     Years since quittin.9    Smokeless tobacco: Never Used   Substance Use Topics    Alcohol use: Yes     Comment: occasionally      Current Outpatient Medications   Medication Sig Dispense Refill    vitamin D3 (CHOLECALCIFEROL) 10 MCG (400 UNIT) TABS tablet Take 1,000 Units by mouth daily      ibuprofen (ADVIL;MOTRIN) 600 MG tablet Take 1 tablet by mouth 3 times daily as needed for Pain or Fever (Take with food 3 times daily for pain) 20 tablet 0     No current facility-administered medications for this visit. No Known Allergies   Health Maintenance   Topic Date Due    Diabetes screen  2022    Pneumococcal 0-64 years Vaccine (3 of 3 - PPSV23) 2022    Lipid screen  2025    DTaP/Tdap/Td vaccine (2 - Td) 2026    Flu vaccine  Completed    HIV screen  Completed    Hepatitis A vaccine  Aged Out    Hepatitis B vaccine  Aged Out    Hib vaccine  Aged Out    Meningococcal (ACWY) vaccine  Aged Out        Subjective:   Review of Systems   Constitutional: Negative for activity change, appetite change, fatigue and fever. HENT: Negative for congestion, dental problem, facial swelling, hearing loss, mouth sores, nosebleeds, sore throat, tinnitus and trouble swallowing. Eyes: Negative for discharge and visual disturbance. Respiratory: Negative for cough, chest tightness, shortness of breath and wheezing. Cardiovascular: Negative for chest pain, palpitations and leg swelling. Gastrointestinal: Negative for abdominal distention, abdominal pain, blood in stool, constipation, diarrhea, nausea, rectal pain and vomiting. Endocrine: Negative for cold intolerance, polydipsia and polyuria. Genitourinary: Negative for decreased urine volume, difficulty urinating, dysuria, flank pain, hematuria and urgency.    Musculoskeletal: Negative for arthralgias, back pain, gait problem, joint swelling, myalgias and neck stiffness. Skin: Negative for color change, rash and wound. Neurological: Negative for dizziness, tremors, seizures, speech difficulty, weakness, light-headedness, numbness and headaches. Hematological: Negative for adenopathy. Does not bruise/bleed easily. Psychiatric/Behavioral: Negative for confusion and sleep disturbance. The patient is not nervous/anxious. Objective:   Physical Exam   Constitutional: He is oriented to person, place, and time. He appears well-developed and well-nourished. No distress. HENT:   Head: Normocephalic. Mouth/Throat: Oropharynx is clear and moist. No oropharyngeal exudate. Eyes: Pupils are equal, round, and reactive to light. EOM are normal. Right eye exhibits no discharge. Left eye exhibits no discharge. No scleral icterus. Neck: Normal range of motion. Neck supple. No JVD present. No tracheal deviation present. No thyromegaly present. Cardiovascular: Normal rate and normal heart sounds. Exam reveals no gallop and no friction rub. No murmur heard. Pulmonary/Chest: Effort normal and breath sounds normal. No stridor. No respiratory distress. He has no wheezes. He has no rales. He exhibits no tenderness. Abdominal: Soft. Bowel sounds are normal. He exhibits no distension and no mass. There is no abdominal tenderness. There is no rebound. Musculoskeletal: Normal range of motion. General: No edema. Lymphadenopathy:     He has no cervical adenopathy. Neurological: He is alert and oriented to person, place, and time. He has normal reflexes. No cranial nerve deficit. He exhibits normal muscle tone. Skin: Skin is warm. No rash noted. No erythema. Psychiatric: He has a normal mood and affect. His behavior is normal. Judgment and thought content normal.   Vitals reviewed.      BP (!) 140/89 (Site: Left Upper Arm, Position: Sitting, Cuff Size: Medium Adult)   Pulse 77   Temp 97.6 °F (36.4 °C) (Infrared)   Resp 18 protein peak   accounts for 3.17 g/dL of the total 3.34 g/dL of protein in   the gamma region. Immunofixation on 02/06/2020 showed:  M-spike in the gamma region.  The monoclonal protein peak   accounts for 3.29 g/dL of the total 3.43 g/dL of protein in   the gamma region. Immunofixation on 08/21/2020 showed:  M-spike in the gamma region.  The monoclonal protein peak   accounts for 3.36 g/dL of the total 3.48 g/dL of protein in   the gamma region.  PAU gel pattern shows an IgG type kappa   monoclonal protein.      Immunofixation on 12/08/2020 showed:  M-spike in the gamma region.  The monoclonal protein peak   accounts for 3.47 g/dL of the total 3.58 g/dL of protein in   the gamma region    06/22/2016:  [place] Qnt Free Light Chains                   8.03 H   0.33-1.94     mg/dL   Lambda Qnt Free Light Chains                  0.77     0.57-2.63     mg/dL   Kappa/Lambda Free Light Chain Ratio          10.43 H   0.26-1.65    12/05/2016:  [place] Qnt Free Light Chains                   9.99 H   0.33-1.94     mg/dL   Lambda Qnt Free Light Chains                  0.85     0.57-2.63     mg/dL   Kappa/Lambda Free Light Chain Ratio          11.75 H   0.26-1.65    02/01/2018:  [place]appa Qnt Free Light Chains                   9.66 H   0.33-1.94     mg/dL   Lambda Qnt Free Light Chains                  0.73     0.57-2.63     mg/dL   Kappa/Lambda Free Light Chain Ratio          13.23 H   0.26-1.65     08/02/2018:  [facility] Qnt Free Light Chains                   8.90 H   0.33-1.94     mg/dL   Lambda Qnt Free Light Chains                  0.65     0.57-2.63     mg/dL   Kappa/Lambda Free Light Chain Ratio          13.69 H   0.26-1.65     02/01/2019:  [facility] Qnt Free Light Chains                   6.95    Lambda Qnt Free Light Chains                  0.58      [place]/Lambda Free Light Chain Ratio          11.98     08/05/2019:  [facility] Qnt Free Light Chains                   6.25    Lambda Qnt Free Light Chains                  0.61     [place]/Lambda Free Light Chain Ratio     10.25      02/06/2020  [place] Qnt Free Light Chains                  15.90    Lambda Qnt Free Light Chains                0.72        [place]/Lambda Free Light Chain Ratio    22.08 H       August 21, 2020:  [place] Qnt Free Light Chains                    64.30 H      Lambda Qnt Free Light Chains                  6.36       [place]/Lambda Free Light Chain Ratio       10.11 H        December 8, 2020:     [place] Qnt Free Light Chains    73.81 H      .   Lambda Qnt Free Light Chains    8.10        [place]/Lambda Free Light Chain Ratio      9.11 H               Bone marrow biopsy on 09/23/2016 showed:      Normocellular bone marrow (60%) with orderly trilineage      hematopoiesis and increased mature plasma cells (17%).     Kappa restricted plasma cells were demonstrated by flow cytometric      analysis. Morphologic and ancillary studies are consistent with a plasma cell      neoplasm (dyscrasia). FISH results were NORMAL    Bone scan on September 15, 2020 showed:  1. No scintigraphic evidence of osseous metastatic disease. 2. Probable degenerative arthropathic changes as detailed above           Assessment/Plan:        Diagnosis Orders   1. Smoldering multiple myeloma (HCC)  CBC Auto Differential    POC PANEL BMP W/IOCA    Electrophoresis Protein, Serum without Reflex to Immunofixation    Immunofixation serum profile    Immunofixation, urine    Immunofixation w/ Quant    Kappa/Lambda Quant Free Light Chains Serum        1. Smoldering Multiple Myeloma (SMM):  This is a 17-year-old male with smoldering multiple myeloma diagnosed in June 2016. He underwent immunofixation electrophoresis that showed M-spike in the gamma region. The monoclonal protein peak accounted for 3.17 g/dL of the total 3.34 g/dL of protein in the gamma region. Bone marrow biopsy showed increased mature plasma cells (17%). No suspicious osseous lesions were identified on the bone survey.  Patient continues to be asymptomatic, he denies having any B symptoms, no bone pain, no peripheral neuropathy. His most recent M protein in December 2020 is sable, however his kappa free light chain is trending up, [place]/Lambda Free Light Chain Ratio remains at around 10. His hemoglobin, creatinine and calcium are still within normal limits. The patient had a bone scan that was negative for evidence of lytic or sclerotic lesions. We will continue surveillance    All patient questions answered. Patient agreed with treatment plan. Follow up as directed.

## 2020-12-18 NOTE — PATIENT INSTRUCTIONS
1. RTC to see me in 4 months.   1 week before RTC labs: CBC, BMP, LFTs, kappa free light chains, multiple myeloma panel

## 2020-12-23 ENCOUNTER — OFFICE VISIT (OUTPATIENT)
Dept: FAMILY MEDICINE CLINIC | Age: 45
End: 2020-12-23
Payer: COMMERCIAL

## 2020-12-23 VITALS
BODY MASS INDEX: 32.64 KG/M2 | DIASTOLIC BLOOD PRESSURE: 70 MMHG | HEART RATE: 86 BPM | RESPIRATION RATE: 12 BRPM | HEIGHT: 64 IN | TEMPERATURE: 97.1 F | WEIGHT: 191.2 LBS | SYSTOLIC BLOOD PRESSURE: 111 MMHG

## 2020-12-23 PROCEDURE — 99214 OFFICE O/P EST MOD 30 MIN: CPT | Performed by: FAMILY MEDICINE

## 2020-12-23 RX ORDER — CHOLECALCIFEROL (VITAMIN D3) 50 MCG
2000 TABLET ORAL DAILY
Qty: 100 TABLET | Refills: 3 | Status: SHIPPED | OUTPATIENT
Start: 2020-12-23

## 2020-12-23 NOTE — PROGRESS NOTES
[address] [facility] [address] [facility], [address]  Ph:   [phone]  Fax: [phone]    Provider:  Dr. Flanagan Every    Patient:  All Murphy  YOB: 1975      Visit Date:  12/23/2020     Reason For Visit:   Chief Complaint   Patient presents with   [address]     Patient was shown to be vit D Def in Feb 2020 has not been taking supplement    Other     Review Bone Scan         Siria Emmanuel is a 39 y.o. male who comes today to the office for follow up. He has vitamin D deficiency, SMM, and constipation. Vitamin D deficiency: diagnosed in February 2020. He forgot to get the vitamins at he pharmacy. He is not taking replacement    SMM:  he remains asymptomatic, he denies having any B symptoms, no bone pain, no peripheral neuropathy. He denies having any fevers, no recent infections. Recent bone scan showed no scintigraphic evidence of osseous metastatic disease and probably degenerative arthropathic changes. Saw Dr. Nancy Hernandez 12/18/2020 who felt that his most recent M protein was a stable, however his kappa free light chain was trending up, kappa/lambda free change ratio remains about 10. All other tests within normal limits. He will follow up with him every 3 months for the time being. In the last several months he has gained weight, about 10 pounds. He states that with the pandemia he stopped using his bike and he feels like his \"metabolism has slowed down\". Significant Past Medical History:    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[facility] [address] 10/01/2016    Multiple Myeloma            Allergies:  has No Known Allergies. Medications:   Current Outpatient Medications   Medication Sig Dispense Refill    vitamin D (CHOLECALCIFEROL) 50 MCG (2000 UT) TABS tablet Take 1 tablet by mouth daily 100 tablet 3     No current facility-administered medications for this visit.         Review of systems: Review of Systems - History obtained from chart review and the patient  General ROS: negative for - chills, fatigue or fever  Psychological ROS: negative for - anxiety, depression or sleep disturbances  ENT ROS: negative for - headaches, nasal congestion or nasal discharge  Allergy and Immunology ROS: negative for - seasonal allergies  Hematological and Lymphatic ROS: negative for - bruising  Endocrine ROS: negative for - malaise/lethargy, polydipsia/polyuria or temperature intolerance  Respiratory ROS: negative for - cough,  shortness of breath or wheezing  Cardiovascular ROS: negative for - chest pain or edema  Gastrointestinal ROS: negative for - abdominal pain, constipation, diarrhea or nausea/vomiting  Musculoskeletal ROS: negative for - joint pain or muscle pain  Neurological ROS: negative for - dizziness  Dermatological ROS: negative for - rash    Physical Examination:  /70 (Site: Left Upper Arm, Position: Sitting, Cuff Size: Large Adult)   Pulse 86   Temp 97.1 °F (36.2 °C) (Temporal)   Resp 12   Ht 5' 3.5\" (1.613 m)   Wt 191 lb 3.2 oz (86.7 kg)   BMI 33.34 kg/m²     General-  Alert and oriented x 3, NAD  HEENT: NC, AT, PERRLA, EOMI, anicteric sclerae  Ears: Normal tympanic membranes bilaterally  Nose: patent, no lesions  Mouth: no lesions, moist mucosas  Neck - supple, no significant adenopathy  Chest - clear to auscultation, no wheezes, rales or rhonchi, symmetric air entry  Heart - normal rate, regular rhythm, normal S1, S2, no murmurs, rubs, clicks or gallops  Abdomen - soft, nontender, nondistended, no masses or organomegaly  Extremities - peripheral pulses normal, no pedal edema, no clubbing or cyanosis    Impression:   Diagnosis Orders   1. Vitamin D deficiency  Vitamin D 25 Hydroxy   2. Constipation, unspecified constipation type  TSH   3. Diabetes mellitus screening  Glucose, Fasting   4. Lipid screening  Lipid, Fasting       Plan:  Start vitamin D 3 2000 international units every day. Check vitamin D level in 3 months  We will check TSH. We will do his screening test before his next follow-up appointment for wellness visit. He has agreed to lose 10 pounds until next visit. Orders Placed This Encounter   Medications    vitamin D (CHOLECALCIFEROL) 50 MCG (2000 UT) TABS tablet     Sig: Take 1 tablet by mouth daily     Dispense:  100 tablet     Refill:  3       Follow Up:  Return in about 2 months (around 2/23/2021).     Dinora Taylor MD

## 2021-02-15 ENCOUNTER — HOSPITAL ENCOUNTER (OUTPATIENT)
Age: 46
Discharge: HOME | End: 2021-02-15
Payer: COMMERCIAL

## 2021-02-15 DIAGNOSIS — K59.00 CONSTIPATION, UNSPECIFIED CONSTIPATION TYPE: ICD-10-CM

## 2021-02-15 DIAGNOSIS — E55.9 VITAMIN D DEFICIENCY: ICD-10-CM

## 2021-02-15 DIAGNOSIS — Z13.220 LIPID SCREENING: ICD-10-CM

## 2021-02-15 DIAGNOSIS — Z13.1 DIABETES MELLITUS SCREENING: ICD-10-CM

## 2021-02-15 LAB
CHOLESTEROL, FASTING: 197 MG/DL (ref 100–199)
GLUCOSE FASTING: 94 MG/DL (ref 70–108)
HDLC SERPL-MCNC: 47 MG/DL
LDL CHOLESTEROL CALCULATED: 105 MG/DL
TRIGLYCERIDE, FASTING: 225 MG/DL (ref 0–199)
TSH SERPL DL<=0.05 MIU/L-ACNC: 1.95 UIU/ML (ref 0.4–4.2)
VITAMIN D 25-HYDROXY: 19 NG/ML (ref 30–100)

## 2021-02-15 PROCEDURE — 36415 COLL VENOUS BLD VENIPUNCTURE: CPT

## 2021-02-15 PROCEDURE — 84443 ASSAY THYROID STIM HORMONE: CPT

## 2021-02-15 PROCEDURE — 80061 LIPID PANEL: CPT

## 2021-02-15 PROCEDURE — 82306 VITAMIN D 25 HYDROXY: CPT

## 2021-02-15 PROCEDURE — 82947 ASSAY GLUCOSE BLOOD QUANT: CPT

## 2021-02-17 ENCOUNTER — TELEPHONE (OUTPATIENT)
Dept: FAMILY MEDICINE CLINIC | Age: 46
End: 2021-02-17

## 2021-02-17 NOTE — TELEPHONE ENCOUNTER
Vitamin D was 19, a little bit better from last year was 17, but is still too low increase vitamin D3 over-the-counter to 5000 international units every day and repeat level in 3 months.   Thyroid was normal.  Lipid file showed total cholesterol 197, triglycerides 225 which is elevated, normal is less than 150 ideally less than 100, HDL or good cholesterol was 47 and bad cholesterol or .  Triglycerides can be decreased decreasing the consumption of processed carbohydrates and fruit juices mainly.  Increasing the consumption of fish, omega-3 fatty acids or fish oil will decrease the triglycerides also.  Will repeat lipid profile in 6 months

## 2021-02-18 ENCOUNTER — OFFICE VISIT (OUTPATIENT)
Dept: FAMILY MEDICINE CLINIC | Age: 46
End: 2021-02-18
Payer: COMMERCIAL

## 2021-02-18 VITALS
SYSTOLIC BLOOD PRESSURE: 132 MMHG | TEMPERATURE: 97.3 F | HEART RATE: 80 BPM | WEIGHT: 194 LBS | RESPIRATION RATE: 12 BRPM | BODY MASS INDEX: 34.38 KG/M2 | DIASTOLIC BLOOD PRESSURE: 74 MMHG | HEIGHT: 63 IN

## 2021-02-18 DIAGNOSIS — R53.83 OTHER FATIGUE: Primary | ICD-10-CM

## 2021-02-18 DIAGNOSIS — E78.1 HYPERTRIGLYCERIDEMIA: ICD-10-CM

## 2021-02-18 DIAGNOSIS — Z91.89 LACK OF MOTIVATION: ICD-10-CM

## 2021-02-18 DIAGNOSIS — E55.9 VITAMIN D DEFICIENCY: ICD-10-CM

## 2021-02-18 DIAGNOSIS — R58 BLEEDING: ICD-10-CM

## 2021-02-18 PROCEDURE — 99214 OFFICE O/P EST MOD 30 MIN: CPT | Performed by: FAMILY MEDICINE

## 2021-02-18 ASSESSMENT — PATIENT HEALTH QUESTIONNAIRE - PHQ9
SUM OF ALL RESPONSES TO PHQ QUESTIONS 1-9: 0
1. LITTLE INTEREST OR PLEASURE IN DOING THINGS: 0

## 2021-02-18 NOTE — PROGRESS NOTES
ENT ROS: negative for - headaches, nasal congestion or nasal discharge  Allergy and Immunology ROS: negative for - seasonal allergies  Hematological and Lymphatic ROS: negative for - bruising  Endocrine ROS: negative for - malaise/lethargy, polydipsia/polyuria or temperature intolerance  Respiratory ROS: negative for - cough,  shortness of breath or wheezing  Cardiovascular ROS: negative for - chest pain or edema  Gastrointestinal ROS: negative for - abdominal pain, constipation, diarrhea or nausea/vomiting  Musculoskeletal ROS: negative for - joint pain or muscle pain  Neurological ROS: negative for - dizziness  Dermatological ROS: negative for - rash    Physical Examination:  /74 (Site: Right Upper Arm, Position: Sitting, Cuff Size: Large Adult)   Pulse 80   Temp 97.3 °F (36.3 °C) (Temporal)   Resp 12   Ht 5' 3\" (1.6 m)   Wt 194 lb (88 kg)   BMI 34.37 kg/m²     General-  Alert and oriented x 3, NAD  HEENT: NC, AT, PERRLA, EOMI, anicteric sclerae  Ears: Normal tympanic membranes bilaterally  Nose: patent, no lesions  Mouth: no lesions, moist mucosas  Neck - supple, no significant adenopathy  Chest - clear to auscultation, no wheezes, rales or rhonchi, symmetric air entry  Heart - normal rate, regular rhythm, normal S1, S2, no murmurs, rubs, clicks or gallops  Abdomen - soft, nontender, nondistended, no masses or organomegaly  Extremities - peripheral pulses normal, no pedal edema, no clubbing or cyanosis    Impression:   Diagnosis Orders   1. Other fatigue  Vitamin B12    Vitamin B1    Vitamin B6    CBC Auto Differential   2. Lack of motivation     3. Hypertriglyceridemia  Lipid Panel   4. Vitamin D deficiency  Vitamin D 25 Hydroxy   5. Bleeding  CBC Auto Differential    Protime-INR       Plan: We will check levels of vitamin B12, B1 and B6. We will do CBC. He has been advised on lifestyle changes to decrease the hypertriglyceridemia, mainly increasing omega-3 fatty acids consumption. He has been advised to place his vitamin D with his toothbrush so he does not forget to take his vitamin. The importance of taking this has been discussed with him. No orders of the defined types were placed in this encounter. Follow Up:  Return in about 4 months (around 6/18/2021).     Landy Echevarria MD

## 2021-02-25 ENCOUNTER — HOSPITAL ENCOUNTER (OUTPATIENT)
Age: 46
Discharge: HOME | End: 2021-02-25
Payer: COMMERCIAL

## 2021-02-25 LAB
BASOPHILS # BLD: 1 %
BASOPHILS ABSOLUTE: 0.1 THOU/MM3 (ref 0–0.1)
EOSINOPHIL # BLD: 2.4 %
EOSINOPHILS ABSOLUTE: 0.2 THOU/MM3 (ref 0–0.4)
ERYTHROCYTE [DISTWIDTH] IN BLOOD BY AUTOMATED COUNT: 14.2 % (ref 11.5–14.5)
ERYTHROCYTE [DISTWIDTH] IN BLOOD BY AUTOMATED COUNT: 47 FL (ref 35–45)
HCT VFR BLD CALC: 46.4 % (ref 42–52)
HEMOGLOBIN: 14.1 GM/DL (ref 14–18)
IMMATURE GRANS (ABS): 0.03 THOU/MM3 (ref 0–0.07)
IMMATURE GRANULOCYTES: 0.4 %
INR BLD: 1.16 (ref 0.85–1.13)
LYMPHOCYTES # BLD: 38.7 %
LYMPHOCYTES ABSOLUTE: 2.8 THOU/MM3 (ref 1–4.8)
MCH RBC QN AUTO: 27.5 PG (ref 26–33)
MCHC RBC AUTO-ENTMCNC: 30.4 GM/DL (ref 32.2–35.5)
MCV RBC AUTO: 90.6 FL (ref 80–94)
MONOCYTES # BLD: 11.9 %
MONOCYTES ABSOLUTE: 0.9 THOU/MM3 (ref 0.4–1.3)
NUCLEATED RED BLOOD CELLS: 0 /100 WBC
PLATELET # BLD: 257 THOU/MM3 (ref 130–400)
PMV BLD AUTO: 10.9 FL (ref 9.4–12.4)
RBC # BLD: 5.12 MILL/MM3 (ref 4.7–6.1)
SEG NEUTROPHILS: 45.6 %
SEGMENTED NEUTROPHILS ABSOLUTE COUNT: 3.3 THOU/MM3 (ref 1.8–7.7)
WBC # BLD: 7.2 THOU/MM3 (ref 4.8–10.8)

## 2021-02-25 PROCEDURE — 85610 PROTHROMBIN TIME: CPT

## 2021-02-25 PROCEDURE — 85025 COMPLETE CBC W/AUTO DIFF WBC: CPT

## 2021-02-25 PROCEDURE — 36415 COLL VENOUS BLD VENIPUNCTURE: CPT

## 2021-03-01 ENCOUNTER — TELEPHONE (OUTPATIENT)
Dept: FAMILY MEDICINE CLINIC | Age: 46
End: 2021-03-01

## 2021-04-09 ENCOUNTER — HOSPITAL ENCOUNTER (OUTPATIENT)
Age: 46
Discharge: HOME | End: 2021-04-09
Payer: COMMERCIAL

## 2021-04-09 DIAGNOSIS — R58 BLEEDING: ICD-10-CM

## 2021-04-09 DIAGNOSIS — E78.1 HYPERTRIGLYCERIDEMIA: ICD-10-CM

## 2021-04-09 DIAGNOSIS — E55.9 VITAMIN D DEFICIENCY: ICD-10-CM

## 2021-04-09 DIAGNOSIS — R53.83 OTHER FATIGUE: ICD-10-CM

## 2021-04-09 DIAGNOSIS — D47.2 SMOLDERING MULTIPLE MYELOMA: ICD-10-CM

## 2021-04-09 LAB
ABSOLUTE IMMATURE GRANULOCYTE: 0.01 THOU/MM3 (ref 0–0.07)
BASINOPHIL, AUTOMATED: 1 % (ref 0–3)
BASOPHILS ABSOLUTE: 0 THOU/MM3 (ref 0–0.1)
BUN, WHOLE BLOOD: 11 MG/DL (ref 8–26)
CHLORIDE, WHOLE BLOOD: 106 MEQ/L (ref 98–109)
CHOLESTEROL, TOTAL: 194 MG/DL (ref 100–199)
CREATININE, WHOLE BLOOD: 0.8 MG/DL (ref 0.5–1.2)
EOSINOPHILS ABSOLUTE: 0.2 THOU/MM3 (ref 0–0.4)
EOSINOPHILS RELATIVE PERCENT: 2 % (ref 0–4)
GFR, ESTIMATED: > 90 ML/MIN/1.73M2
GLUCOSE, WHOLE BLOOD: 92 MG/DL (ref 70–108)
HCT VFR BLD CALC: 43.4 % (ref 42–52)
HDLC SERPL-MCNC: 47 MG/DL
HEMOGLOBIN: 13.8 GM/DL (ref 14–18)
IMMATURE GRANULOCYTES: 0 %
INR BLD: 1.07 (ref 0.85–1.13)
IONIZED CALCIUM, WHOLE BLOOD: 1.16 MMOL/L (ref 1.12–1.32)
LDL CHOLESTEROL CALCULATED: 98 MG/DL
LYMPHOCYTES # BLD: 41 % (ref 15–47)
LYMPHOCYTES ABSOLUTE: 2.6 THOU/MM3 (ref 1–4.8)
MCH RBC QN AUTO: 28.2 PG (ref 26–33)
MCHC RBC AUTO-ENTMCNC: 31.8 GM/DL (ref 32.2–35.5)
MCV RBC AUTO: 89 FL (ref 80–94)
MONOCYTES ABSOLUTE: 0.7 THOU/MM3 (ref 0.4–1.3)
MONOCYTES: 11 % (ref 0–12)
PDW BLD-RTO: 13.9 % (ref 11.5–14.5)
PLATELET # BLD: 260 THOU/MM3 (ref 130–400)
PMV BLD AUTO: 10.6 FL (ref 9.4–12.4)
POTASSIUM, WHOLE BLOOD: 4 MEQ/L (ref 3.5–4.9)
RBC # BLD: 4.9 MILL/MM3 (ref 4.7–6.1)
SEG NEUTROPHILS: 45 % (ref 43–75)
SEGMENTED NEUTROPHILS ABSOLUTE COUNT: 2.8 THOU/MM3 (ref 1.8–7.7)
SODIUM, WHOLE BLOOD: 141 MEQ/L (ref 138–146)
TOTAL CO2, WHOLE BLOOD: 27 MEQ/L (ref 23–33)
TRIGL SERPL-MCNC: 244 MG/DL (ref 0–199)
VITAMIN B-12: 379 PG/ML (ref 211–911)
VITAMIN D 25-HYDROXY: 23 NG/ML (ref 30–100)
WBC # BLD: 6.3 THOU/MM3 (ref 4.8–10.8)

## 2021-04-09 PROCEDURE — 84207 ASSAY OF VITAMIN B-6: CPT

## 2021-04-09 PROCEDURE — 85610 PROTHROMBIN TIME: CPT

## 2021-04-09 PROCEDURE — 84165 PROTEIN E-PHORESIS SERUM: CPT

## 2021-04-09 PROCEDURE — 86334 IMMUNOFIX E-PHORESIS SERUM: CPT

## 2021-04-09 PROCEDURE — 80061 LIPID PANEL: CPT

## 2021-04-09 PROCEDURE — 84156 ASSAY OF PROTEIN URINE: CPT

## 2021-04-09 PROCEDURE — 82784 ASSAY IGA/IGD/IGG/IGM EACH: CPT

## 2021-04-09 PROCEDURE — 84425 ASSAY OF VITAMIN B-1: CPT

## 2021-04-09 PROCEDURE — 80047 BASIC METABLC PNL IONIZED CA: CPT

## 2021-04-09 PROCEDURE — 36415 COLL VENOUS BLD VENIPUNCTURE: CPT

## 2021-04-09 PROCEDURE — 85025 COMPLETE CBC W/AUTO DIFF WBC: CPT

## 2021-04-09 PROCEDURE — 84155 ASSAY OF PROTEIN SERUM: CPT

## 2021-04-09 PROCEDURE — 82607 VITAMIN B-12: CPT

## 2021-04-09 PROCEDURE — 82306 VITAMIN D 25 HYDROXY: CPT

## 2021-04-09 PROCEDURE — 86335 IMMUNFIX E-PHORSIS/URINE/CSF: CPT

## 2021-04-09 PROCEDURE — 83883 ASSAY NEPHELOMETRY NOT SPEC: CPT

## 2021-04-11 LAB — IMMUNOFIXATION URINE: NORMAL

## 2021-04-12 LAB
IMMUNOFIXATION WITH QUANT: NORMAL
KAPPA/LAMBDA FREE LIGHT CHAINS: NORMAL
VITAMIN B6: 32.5 NMOL/L (ref 20–125)

## 2021-04-13 ENCOUNTER — TELEPHONE (OUTPATIENT)
Dept: FAMILY MEDICINE CLINIC | Age: 46
End: 2021-04-13

## 2021-04-13 LAB — VITAMIN B1 WHOLE BLOOD: 81 NMOL/L (ref 70–180)

## 2021-04-13 NOTE — TELEPHONE ENCOUNTER
Henok Walker MD   4/11/2021  2:10 PM EDT      Vitamin D was 23.  Normal is  ideally 45-55.  It is better than a year ago but he still would need to improve it.  Increase vitamin D to 3000 international units every day, expose the skin to the sun for about 10 to 15 minutes every day before 10 AM or after 4 PM.  That will help to synthesize vitamin D.  Vitamin B12 was 379, normal on the low side.  Increase foods rich on B12.  Please mail him information about vitamins contents in food.  Lipid profile showed total of 294, triglycerides 244, HDL 47 and LDL 98.  They are all within acceptable levels except for triglycerides which are too elevated.  Avoid fruit juices and processed carbohydrates.  Will repeat fasting lipid follow-up in 3 months

## 2021-04-22 ASSESSMENT — ENCOUNTER SYMPTOMS
EYE DISCHARGE: 0
ABDOMINAL PAIN: 0
FACIAL SWELLING: 0
BACK PAIN: 0
SHORTNESS OF BREATH: 0
CHEST TIGHTNESS: 0
ABDOMINAL DISTENTION: 0
COLOR CHANGE: 0
CONSTIPATION: 0
WHEEZING: 0
RECTAL PAIN: 0
COUGH: 0
VOMITING: 0
SORE THROAT: 0
NAUSEA: 0
DIARRHEA: 0
BLOOD IN STOOL: 0
TROUBLE SWALLOWING: 0

## 2021-04-23 ENCOUNTER — HOSPITAL ENCOUNTER (OUTPATIENT)
Dept: INFUSION THERAPY | Age: 46
Discharge: HOME | End: 2021-04-23
Payer: COMMERCIAL

## 2021-04-23 ENCOUNTER — OFFICE VISIT (OUTPATIENT)
Dept: ONCOLOGY | Age: 46
End: 2021-04-23
Payer: COMMERCIAL

## 2021-04-23 VITALS
TEMPERATURE: 97.5 F | RESPIRATION RATE: 18 BRPM | SYSTOLIC BLOOD PRESSURE: 152 MMHG | BODY MASS INDEX: 34.05 KG/M2 | OXYGEN SATURATION: 98 % | WEIGHT: 192.2 LBS | HEART RATE: 64 BPM | HEIGHT: 63 IN | DIASTOLIC BLOOD PRESSURE: 94 MMHG

## 2021-04-23 DIAGNOSIS — D47.2 SMOLDERING MULTIPLE MYELOMA: Primary | ICD-10-CM

## 2021-04-23 DIAGNOSIS — C90.00 INDOLENT MULTIPLE MYELOMA (HCC): ICD-10-CM

## 2021-04-23 PROCEDURE — 99211 OFF/OP EST MAY X REQ PHY/QHP: CPT

## 2021-04-23 PROCEDURE — 99214 OFFICE O/P EST MOD 30 MIN: CPT | Performed by: INTERNAL MEDICINE

## 2021-04-23 NOTE — PROGRESS NOTES
[facility] PROFESSIONAL SERVS  [facility]  [address] [facility] [address]  [address] [address]  [place] [address]  Dept: [phone]  Dept Fax: [phone]: [phone]     Visit Date: 4/23/2021     Yuri Carvalho is a 39 y.o. 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 When he applied for life insurance he was denied because his total protein and globulin were elevated on screening lab work. Therefore he underwent further testing in June 2016. The immunofixation electrophoresis showed M-spike in the gamma region. The monoclonal protein peak accounted for 3.17 g/dL of the total 3.34 g/dL of protein in  the gamma region.  PAU pattern showed an IgG type kappa monoclonal protein. The patient had a bone marrow biopsy, cytogenetic analysis and bone survey. Bone marrow biopsy showed increased mature plasma cells (17%). No suspicious osseous lesions were identified on the bone survey. His labs met criteria for SMM ( an M-protein ?3 g/dL, 17% bone marrow plasma cells and no end-organ damage). The patient is currently followed by surveillance, no active treatment    Interim history on 04/23/2021:  Patient presents to the medical oncology clinic for 4 months follow-up visit. Today, he remains asymptomatic, he denies having any B symptoms, no bone pain, no peripheral neuropathy. He denies having any fevers, no recent infections. He continues to maintain stable weight with diet and exercise. He denies being placed on any new medications since last visit.   No visits to the emergency room, no admissions to the hospital.    Family History   Problem Relation Age of Onset    High Blood Pressure Mother     Other Mother         osteoporosis    Asthma Father     High Blood Pressure Father       Social History     Tobacco Use    Smoking status: Former Smoker     Packs/day: 0.50     Years: 10.00     Pack years: 5.00     Types: Cigarettes     Quit date: 2008     Years since quittin.3    Smokeless tobacco: Never Used   Substance Use Topics    Alcohol use: Yes     Comment: occasionally      Current Outpatient Medications   Medication Sig Dispense Refill    vitamin D (CHOLECALCIFEROL) 50 MCG (2000 UT) TABS tablet Take 1 tablet by mouth daily 100 tablet 3     No current facility-administered medications for this visit. No Known Allergies   Health Maintenance   Topic Date Due    Pneumococcal 0-64 years Vaccine (3 of 3 - PPSV23) 2022    Diabetes screen  02/15/2024    Lipid screen  2026    DTaP/Tdap/Td vaccine (2 - Td) 2026    Flu vaccine  Completed    COVID-19 Vaccine  Completed    Hepatitis C screen  Completed    HIV screen  Completed    Hepatitis A vaccine  Aged Out    Hepatitis B vaccine  Aged Out    Hib vaccine  Aged Out    Meningococcal (ACWY) vaccine  Aged Out        Subjective:   Review of Systems   Constitutional: Negative for activity change, appetite change, fatigue and fever. HENT: Negative for congestion, dental problem, facial swelling, hearing loss, mouth sores, nosebleeds, sore throat, tinnitus and trouble swallowing. Eyes: Negative for discharge and visual disturbance. Respiratory: Negative for cough, chest tightness, shortness of breath and wheezing. Cardiovascular: Negative for chest pain, palpitations and leg swelling. Gastrointestinal: Negative for abdominal distention, abdominal pain, blood in stool, constipation, diarrhea, nausea, rectal pain and vomiting. Endocrine: Negative for cold intolerance, polydipsia and polyuria. Genitourinary: Negative for decreased urine volume, difficulty urinating, dysuria, flank pain, hematuria and urgency. Musculoskeletal: Negative for arthralgias, back pain, gait problem, joint swelling, myalgias and neck stiffness.    Skin: Negative for color change, rash and wound. Neurological: Negative for dizziness, tremors, seizures, speech difficulty, weakness, light-headedness, numbness and headaches. Hematological: Negative for adenopathy. Does not bruise/bleed easily. Psychiatric/Behavioral: Negative for confusion and sleep disturbance. The patient is not nervous/anxious. Objective:   Physical Exam   Constitutional: He is oriented to person, place, and time. He appears well-developed and well-nourished. No distress. HENT:   Head: Normocephalic. Mouth/Throat: Oropharynx is clear and moist. No oropharyngeal exudate. Eyes: Pupils are equal, round, and reactive to light. EOM are normal. Right eye exhibits no discharge. Left eye exhibits no discharge. No scleral icterus. Neck: Normal range of motion. Neck supple. No JVD present. No tracheal deviation present. No thyromegaly present. Cardiovascular: Normal rate and normal heart sounds. Exam reveals no gallop and no friction rub. No murmur heard. Pulmonary/Chest: Effort normal and breath sounds normal. No stridor. No respiratory distress. He has no wheezes. He has no rales. He exhibits no tenderness. Abdominal: Soft. Bowel sounds are normal. He exhibits no distension and no mass. There is no abdominal tenderness. There is no rebound. Musculoskeletal: Normal range of motion. General: No edema. Lymphadenopathy:     He has no cervical adenopathy. Neurological: He is alert and oriented to person, place, and time. He has normal reflexes. No cranial nerve deficit. He exhibits normal muscle tone. Skin: Skin is warm. No rash noted. No erythema. Psychiatric: He has a normal mood and affect. His behavior is normal. Judgment and thought content normal.   Vitals reviewed.      BP (!) 152/94 (Site: Left Upper Arm, Position: Sitting, Cuff Size: Medium Adult)   Pulse 64   Temp 97.5 °F (36.4 °C) (Oral)   Resp 18   Ht 5' 3\" (1.6 m)   Wt 192 lb 3.2 oz (87.2 kg)   SpO2 98%   BMI 34.05 kg/m²      Imaging studies and labs:   Bone survey on 09/25/2016 showed:  1. No suspicious osseous lesions are identified on the bone survey. Lab Results   Component Value Date    WBC 6.3 04/09/2021    HGB 13.8 (L) 04/09/2021    HCT 43.4 04/09/2021    MCV 89 04/09/2021     04/09/2021       Chemistry        Component Value Date/Time     04/09/2021 1145     (L) 08/21/2020 1243    K 4.0 04/09/2021 1145    K 4.2 08/21/2020 1243     08/21/2020 1243    CO2 24 08/21/2020 1243    BUN 11 08/21/2020 1243    CREATININE 0.8 04/09/2021 1145    CREATININE 0.7 08/21/2020 1243        Component Value Date/Time    CALCIUM 9.0 08/21/2020 1243    ALKPHOS 59 02/06/2020 0915    AST 28 02/06/2020 0915    ALT 32 02/06/2020 0915    BILITOT 0.3 02/06/2020 0915        Lab Results   Component Value Date    ALT 32 02/06/2020    AST 28 02/06/2020    ALKPHOS 59 02/06/2020    BILITOT 0.3 02/06/2020       Immunofixation on June 22, 2016 showed:   The monoclonal protein peak accounts for 3.17 g/dL of the total 3.34 g/dL of protein in the gamma region. Immunofixation on December 5, 2016 showed: The monoclonal protein peak accounts for 2.91 g/dL of the total 3.00 g/dL of protein in the gamma region. Immunofixation on August 22, 2017 showed: The monoclonal protein peak accounts for 3.57 g/dL of the total 3.70 g/dL of protein in the gamma region. Immunofixation on February 1, 2018 showed: The monoclonal protein peak  accounts for 3.33 g/dL of the total 3.49 g/dL of protein in the gamma region. Immunofixation on 08/02/2018 showed: The monoclonal protein peak accounts for 3.22 g/dL of the total 3.38 g/dL of protein in the gamma region. Immunofixation on 02/01/2019 showed: The monoclonal protein peak accounts for 3.45 g/dL of the total 3.62 g/dL of protein in the gamma region.    Immunofixation on 08/05/2019 showed:   The monoclonal protein peak accounts for 3.17 g/dL of the total 3.34 g/dL of protein in the gamma region. Immunofixation on 02/06/2020 showed: The monoclonal protein peak accounts for 3.29 g/dL of the total 3.43 g/dL of protein in the gamma region. Immunofixation on 08/21/2020 showed: The monoclonal protein peak accounts for 3.36 g/dL of the total 3.48 g/dL of protein in   the gamma region. Immunofixation on 12/08/2020 showed: The monoclonal protein peak accounts for 3.47 g/dL of the total 3.58 g/dL of protein in the gamma region  Immunofixation on 04/09/2021 showed: The monoclonal protein peak accounts for 3.15 g/dL of the total 3.23 g/dL of protein in   the gamma region.     06/22/2016:  [place] Qnt Free Light Chains                   8.03 H   0.33-1.94     mg/dL   Lambda Qnt Free Light Chains                  0.77     0.57-2.63     mg/dL   Kappa/Lambda Free Light Chain Ratio          10.43 H   0.26-1.65    12/05/2016:  Kappa Qnt Free Light Chains                   9.99 H   0.33-1.94     mg/dL   Lambda Qnt Free Light Chains                  0.85     0.57-2.63     mg/dL   Kappa/Lambda Free Light Chain Ratio          11.75 H   0.26-1.65    02/01/2018:  Kappa Qnt Free Light Chains                   9.66 H   0.33-1.94     mg/dL   Lambda Qnt Free Light Chains                  0.73     0.57-2.63     mg/dL   Kappa/Lambda Free Light Chain Ratio          13.23 H   0.26-1.65     08/02/2018:  [place] Qnt Free Light Chains                   8.90 H   0.33-1.94     mg/dL   Lambda Qnt Free Light Chains                  0.65     0.57-2.63     mg/dL   Kappa/Lambda Free Light Chain Ratio          13.69 H   0.26-1.65     02/01/2019:  [place] Qnt Free Light Chains                   6.95    Lambda Qnt Free Light Chains                  0.58      [place]/Lambda Free Light Chain Ratio          11.98     08/05/2019:  [place] Qnt Free Light Chains                   6.25    Lambda Qnt Free Light Chains                  0.61      [place]/Lambda Free Light Chain Ratio     10.25      02/06/2020  [place] Qnt Free Light Chains                  15.90    Lambda Qnt Free Light Chains                0.72        [place]/Lambda Free Light Chain Ratio    22.08 H       August 21, 2020:  [place] Qnt Free Light Chains                    64.30 H      Lambda Qnt Free Light Chains                  6.36       [place]/Lambda Free Light Chain Ratio       10.11 H      December 8, 2020:   [place] Qnt Free Light Chains    73.81 H      .   Lambda Qnt Free Light Chains    8.10        [place]/Lambda Free Light Chain Ratio      9.11 H      April 9, 2021:           Kappa Qnt Free Light Chains                  78.17 H   3.30-19.40    mg/L   Lambda Qnt Free Light Chains                  5.13 L   5.71-26.30    mg/L   Kappa/Lambda Free Light Chain Ratio    15.24 H   0.26-1.65       Bone marrow biopsy on 09/23/2016 showed:      Normocellular bone marrow (60%) with orderly trilineage      hematopoiesis and increased mature plasma cells (17%).     Kappa restricted plasma cells were demonstrated by flow cytometric      analysis. Morphologic and ancillary studies are consistent with a plasma cell      neoplasm (dyscrasia). FISH results were NORMAL    Bone scan on September 15, 2020 showed:  1. No scintigraphic evidence of osseous metastatic disease. 2. Probable degenerative arthropathic changes as detailed above           Assessment/Plan:        Diagnosis Orders   1. Smoldering multiple myeloma (HCC)  Electrophoresis Protein, Serum without Reflex to Immunofixation    Immunofixation serum profile    Immunofixation w/ Quant    CBC Auto Differential    POC PANEL BMP W/IOCA    Kappa/Lambda Quant Free Light Chains Serum   2. Indolent multiple myeloma ([address]          1. Smoldering Multiple Myeloma (SMM):  This is a 20-year-old male with smoldering multiple myeloma diagnosed in June 2016. He underwent immunofixation electrophoresis that showed M-spike in the gamma region. The monoclonal protein peak accounted for 3.17 g/dL of the total 3.34 g/dL of protein in the gamma region.  Bone marrow biopsy showed increased mature plasma cells (17%). No suspicious osseous lesions were identified on the bone survey. Patient continues to be asymptomatic, he denies having any B symptoms, no bone pain, no peripheral neuropathy. His M protein in December 2020 was stable, however his kappa free light chain was trending up, [place]/Lambda Free Light Chain Ratio remained at around 10. His hemoglobin, creatinine and calcium were within normal limits. The patient had a bone scan that was negative for evidence of lytic or sclerotic lesions. We continue surveillance. His M protein is stable. Kappa free light chain is slightly increased  From 73.81 in December 2020 to 78.17 in April 2021. We will continue monitoring  All patient questions answered. Patient agreed with treatment plan. Follow up as directed.

## 2021-09-15 ENCOUNTER — HOSPITAL ENCOUNTER (OUTPATIENT)
Age: 46
Discharge: HOME | End: 2021-09-15
Payer: COMMERCIAL

## 2021-09-15 DIAGNOSIS — D47.2 SMOLDERING MULTIPLE MYELOMA: ICD-10-CM

## 2021-09-15 LAB
ABSOLUTE IMMATURE GRANULOCYTE: 0.02 THOU/MM3 (ref 0–0.07)
BASINOPHIL, AUTOMATED: 0 % (ref 0–3)
BASOPHILS ABSOLUTE: 0 THOU/MM3 (ref 0–0.1)
BUN, WHOLE BLOOD: 14 MG/DL (ref 8–26)
CHLORIDE, WHOLE BLOOD: 107 MEQ/L (ref 98–109)
CREATININE, WHOLE BLOOD: 0.9 MG/DL (ref 0.5–1.2)
EOSINOPHILS ABSOLUTE: 0.2 THOU/MM3 (ref 0–0.4)
EOSINOPHILS RELATIVE PERCENT: 3 % (ref 0–4)
GFR, ESTIMATED: > 90 ML/MIN/1.73M2
GLUCOSE, WHOLE BLOOD: 111 MG/DL (ref 70–108)
HCT VFR BLD CALC: 43.2 % (ref 42–52)
HEMOGLOBIN: 13.9 GM/DL (ref 14–18)
IMMATURE GRANULOCYTES: 0 %
IONIZED CALCIUM, WHOLE BLOOD: 1.19 MMOL/L (ref 1.12–1.32)
LYMPHOCYTES # BLD: 40 % (ref 15–47)
LYMPHOCYTES ABSOLUTE: 2.8 THOU/MM3 (ref 1–4.8)
MCH RBC QN AUTO: 28.6 PG (ref 26–33)
MCHC RBC AUTO-ENTMCNC: 32.2 GM/DL (ref 32.2–35.5)
MCV RBC AUTO: 89 FL (ref 80–94)
MONOCYTES ABSOLUTE: 0.8 THOU/MM3 (ref 0.4–1.3)
MONOCYTES: 12 % (ref 0–12)
PDW BLD-RTO: 13.8 % (ref 11.5–14.5)
PLATELET # BLD: 247 THOU/MM3 (ref 130–400)
PMV BLD AUTO: 10.8 FL (ref 9.4–12.4)
POTASSIUM, WHOLE BLOOD: 4.3 MEQ/L (ref 3.5–4.9)
RBC # BLD: 4.86 MILL/MM3 (ref 4.7–6.1)
SEG NEUTROPHILS: 44 % (ref 43–75)
SEGMENTED NEUTROPHILS ABSOLUTE COUNT: 3.1 THOU/MM3 (ref 1.8–7.7)
SODIUM, WHOLE BLOOD: 141 MEQ/L (ref 138–146)
TOTAL CO2, WHOLE BLOOD: 26 MEQ/L (ref 23–33)
WBC # BLD: 7 THOU/MM3 (ref 4.8–10.8)

## 2021-09-15 PROCEDURE — 80047 BASIC METABLC PNL IONIZED CA: CPT

## 2021-09-15 PROCEDURE — 84165 PROTEIN E-PHORESIS SERUM: CPT

## 2021-09-15 PROCEDURE — 84155 ASSAY OF PROTEIN SERUM: CPT

## 2021-09-15 PROCEDURE — 83883 ASSAY NEPHELOMETRY NOT SPEC: CPT

## 2021-09-15 PROCEDURE — 86334 IMMUNOFIX E-PHORESIS SERUM: CPT

## 2021-09-15 PROCEDURE — 36415 COLL VENOUS BLD VENIPUNCTURE: CPT

## 2021-09-15 PROCEDURE — 82784 ASSAY IGA/IGD/IGG/IGM EACH: CPT

## 2021-09-15 PROCEDURE — 85025 COMPLETE CBC W/AUTO DIFF WBC: CPT

## 2021-09-18 LAB — KAPPA/LAMBDA FREE LIGHT CHAINS: NORMAL

## 2021-09-19 LAB — IMMUNOFIXATION WITH QUANT: NORMAL

## 2021-09-20 LAB — PROTEIN ELECTROPHORESIS, SERUM: NORMAL

## 2021-09-24 ENCOUNTER — HOSPITAL ENCOUNTER (OUTPATIENT)
Dept: INFUSION THERAPY | Age: 46
Discharge: HOME | End: 2021-09-24
Payer: COMMERCIAL

## 2021-09-24 ENCOUNTER — OFFICE VISIT (OUTPATIENT)
Dept: ONCOLOGY | Age: 46
End: 2021-09-24
Payer: COMMERCIAL

## 2021-09-24 VITALS
BODY MASS INDEX: 34.76 KG/M2 | OXYGEN SATURATION: 99 % | HEIGHT: 63 IN | DIASTOLIC BLOOD PRESSURE: 85 MMHG | TEMPERATURE: 98 F | HEART RATE: 101 BPM | RESPIRATION RATE: 18 BRPM | WEIGHT: 196.2 LBS | SYSTOLIC BLOOD PRESSURE: 125 MMHG

## 2021-09-24 DIAGNOSIS — D47.2 SMOLDERING MULTIPLE MYELOMA: Primary | ICD-10-CM

## 2021-09-24 PROCEDURE — 99211 OFF/OP EST MAY X REQ PHY/QHP: CPT

## 2021-09-24 PROCEDURE — 99214 OFFICE O/P EST MOD 30 MIN: CPT | Performed by: INTERNAL MEDICINE

## 2021-09-24 ASSESSMENT — ENCOUNTER SYMPTOMS
EYE DISCHARGE: 0
BLOOD IN STOOL: 0
WHEEZING: 0
ABDOMINAL DISTENTION: 0
TROUBLE SWALLOWING: 0
SORE THROAT: 0
NAUSEA: 0
DIARRHEA: 0
CHEST TIGHTNESS: 0
CONSTIPATION: 0
COUGH: 0
SHORTNESS OF BREATH: 0
RECTAL PAIN: 0
COLOR CHANGE: 0
FACIAL SWELLING: 0
BACK PAIN: 0
VOMITING: 0
ABDOMINAL PAIN: 0

## 2021-09-24 NOTE — PROGRESS NOTES
[facility] PROFESSIONAL SERVS  [facility]  [address]  [address] [address]  Dept: [phone]  Dept Fax: [phone]: [phone]     Visit Date: 9/24/2021     Claudia Juarez is a 39 y.o. male who presents today for:   Chief Complaint   Patient presents with    Follow-up     Smoldering multiple*myeloma*Labs Prior        HPI     Ruth Covarrubias is a 39 y.o. 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 When he applied for life insurance he was denied because his total protein and globulin were elevated on screening lab work. Therefore he underwent further testing in June 2016. The immunofixation electrophoresis showed M-spike in the gamma region. The monoclonal protein peak accounted for 3.17 g/dL of the total 3.34 g/dL of protein in  the gamma region.  PAU pattern showed an IgG type kappa monoclonal protein. The patient had a bone marrow biopsy, cytogenetic analysis and bone survey. Bone marrow biopsy showed increased mature plasma cells (17%). No suspicious osseous lesions were identified on the bone survey. His labs met criteria for SMM ( an M-protein ?3 g/dL, 17% bone marrow plasma cells and no end-organ damage). The patient is currently followed by surveillance, no active treatment    Interim history on 09/24/2021:  Patient presents to the medical oncology clinic for 4 months follow-up visit. Today, he remains asymptomatic, he denies having any B symptoms, no bone pain, no peripheral neuropathy. He denies having any fevers, no recent infections. He continues to maintain stable weight with diet and exercise. He denies being placed on any new medications since last visit.   No visits to the emergency room, no admissions to the hospital.    Family History   Problem Relation Age of Onset    High Blood Pressure Mother     Other Mother         osteoporosis    Asthma Father     High Blood Pressure Father       Social History     Tobacco Use    Smoking status: Former Smoker     Packs/day: 0.50     Years: 10.00     Pack years: 5.00     Types: Cigarettes     Quit date: 2008     Years since quittin.7    Smokeless tobacco: Never Used   Substance Use Topics    Alcohol use: Yes     Comment: occasionally      Current Outpatient Medications   Medication Sig Dispense Refill    vitamin D (CHOLECALCIFEROL) 50 MCG (2000 UT) TABS tablet Take 1 tablet by mouth daily 100 tablet 3     No current facility-administered medications for this visit. No Known Allergies   Health Maintenance   Topic Date Due    Colon cancer screen colonoscopy  Never done    Pneumococcal 0-64 years Vaccine (3 of 4 - PPSV23) 2022    Diabetes screen  02/15/2024    Lipid screen  2026    DTaP/Tdap/Td vaccine (2 - Td or Tdap) 2026    Flu vaccine  Completed    COVID-19 Vaccine  Completed    Hepatitis C screen  Completed    HIV screen  Completed    Hepatitis A vaccine  Aged Out    Hepatitis B vaccine  Aged Out    Hib vaccine  Aged Out    Meningococcal (ACWY) vaccine  Aged Out        Subjective:   Review of Systems   Constitutional: Negative for activity change, appetite change, fatigue and fever. HENT: Negative for congestion, dental problem, facial swelling, hearing loss, mouth sores, nosebleeds, sore throat, tinnitus and trouble swallowing. Eyes: Negative for discharge and visual disturbance. Respiratory: Negative for cough, chest tightness, shortness of breath and wheezing. Cardiovascular: Negative for chest pain, palpitations and leg swelling. Gastrointestinal: Negative for abdominal distention, abdominal pain, blood in stool, constipation, diarrhea, nausea, rectal pain and vomiting. Endocrine: Negative for cold intolerance, polydipsia and polyuria. Genitourinary: Negative for decreased urine volume, difficulty urinating, dysuria, flank pain, hematuria and urgency.    Musculoskeletal: Negative for arthralgias, back pain, gait problem, joint swelling, myalgias and neck stiffness. Skin: Negative for color change, rash and wound. Neurological: Negative for dizziness, tremors, seizures, speech difficulty, weakness, light-headedness, numbness and headaches. Hematological: Negative for adenopathy. Does not bruise/bleed easily. Psychiatric/Behavioral: Negative for confusion and sleep disturbance. The patient is not nervous/anxious. Objective:   Physical Exam  Vitals reviewed. Constitutional:       General: He is not in acute distress. Appearance: He is well-developed. HENT:      Head: Normocephalic. Mouth/Throat:      Pharynx: No oropharyngeal exudate. Eyes:      General: No scleral icterus. Right eye: No discharge. Left eye: No discharge. Pupils: Pupils are equal, round, and reactive to light. Neck:      Thyroid: No thyromegaly. Vascular: No JVD. Trachea: No tracheal deviation. Cardiovascular:      Rate and Rhythm: Normal rate. Heart sounds: Normal heart sounds. No murmur heard. No friction rub. No gallop. Pulmonary:      Effort: Pulmonary effort is normal. No respiratory distress. Breath sounds: Normal breath sounds. No stridor. No wheezing or rales. Chest:      Chest wall: No tenderness. Abdominal:      General: Bowel sounds are normal. There is no distension. Palpations: Abdomen is soft. There is no mass. Tenderness: There is no abdominal tenderness. There is no rebound. Musculoskeletal:         General: Normal range of motion. Cervical back: Normal range of motion and neck supple. Lymphadenopathy:      Cervical: No cervical adenopathy. Skin:     General: Skin is warm. Findings: No erythema or rash. Neurological:      Mental Status: He is alert and oriented to person, place, and time. Cranial Nerves: No cranial nerve deficit. Motor: No abnormal muscle tone. Deep Tendon Reflexes: Reflexes are normal and symmetric. Psychiatric:         Behavior: Behavior normal.         Thought Content: Thought content normal.         Judgment: Judgment normal.        /85 (Site: Left Upper Arm, Position: Sitting, Cuff Size: Medium Adult)   Pulse 101   Temp 98 °F (36.7 °C) (Oral)   Resp 18   Ht 5' 3\" (1.6 m)   Wt 196 lb 3.2 oz (89 kg)   SpO2 99%   BMI 34.76 kg/m²      Imaging studies and labs:   Bone survey on 09/25/2016 showed:  1. No suspicious osseous lesions are identified on the bone survey. Lab Results   Component Value Date    WBC 7.0 09/15/2021    HGB 13.9 (L) 09/15/2021    HCT 43.2 09/15/2021    MCV 89 09/15/2021     09/15/2021       Chemistry        Component Value Date/Time     09/15/2021 1534     (L) 08/21/2020 1243    K 4.3 09/15/2021 1534    K 4.2 08/21/2020 1243     08/21/2020 1243    CO2 24 08/21/2020 1243    BUN 11 08/21/2020 1243    CREATININE 0.9 09/15/2021 1534    CREATININE 0.7 08/21/2020 1243        Component Value Date/Time    CALCIUM 9.0 08/21/2020 1243    ALKPHOS 59 02/06/2020 0915    AST 28 02/06/2020 0915    ALT 32 02/06/2020 0915    BILITOT 0.3 02/06/2020 0915        Lab Results   Component Value Date    ALT 32 02/06/2020    AST 28 02/06/2020    ALKPHOS 59 02/06/2020    BILITOT 0.3 02/06/2020       Immunofixation on June 22, 2016 showed:   The monoclonal protein peak accounts for 3.17 g/dL of the total 3.34 g/dL of protein in the gamma region. Immunofixation on December 5, 2016 showed: The monoclonal protein peak accounts for 2.91 g/dL of the total 3.00 g/dL of protein in the gamma region. Immunofixation on August 22, 2017 showed: The monoclonal protein peak accounts for 3.57 g/dL of the total 3.70 g/dL of protein in the gamma region. Immunofixation on February 1, 2018 showed: The monoclonal protein peak  accounts for 3.33 g/dL of the total 3.49 g/dL of protein in the gamma region. Immunofixation on 08/02/2018 showed:   The monoclonal protein peak accounts for 3.22 g/dL of the total 3.38 g/dL of protein in the gamma region. Immunofixation on 02/01/2019 showed: The monoclonal protein peak accounts for 3.45 g/dL of the total 3.62 g/dL of protein in the gamma region.    Immunofixation on 08/05/2019 showed: The monoclonal protein peak accounts for 3.17 g/dL of the total 3.34 g/dL of protein in the gamma region. Immunofixation on 02/06/2020 showed: The monoclonal protein peak accounts for 3.29 g/dL of the total 3.43 g/dL of protein in the gamma region. Immunofixation on 08/21/2020 showed: The monoclonal protein peak accounts for 3.36 g/dL of the total 3.48 g/dL of protein in   the gamma region. Immunofixation on 12/08/2020 showed: The monoclonal protein peak accounts for 3.47 g/dL of the total 3.58 g/dL of protein in the gamma region  Immunofixation on 04/09/2021 showed: The monoclonal protein peak accounts for 3.15 g/dL of the total 3.23 g/dL of protein in   the gamma region. Immunofixation on 09/15/2021 showed:   The monoclonal protein peak accounts for 3.44 g/dL of the total 3.52 g/dL of protein in   the gamma region.      06/22/2016:  [place] Qnt Free Light Chains                   8.03 H   0.33-1.94     mg/dL   [place] Qnt Free Light Chains                  0.77     0.57-2.63     mg/dL   Kappa/Lambda Free Light Chain Ratio          10.43 H   0.26-1.65    12/05/2016:  [place] Qnt Free Light Chains                   9.99 H   0.33-1.94     mg/dL   Lambda Qnt Free Light Chains                  0.85     0.57-2.63     mg/dL   Kappa/Lambda Free Light Chain Ratio          11.75 H   0.26-1.65    02/01/2018:  [place] Qnt Free Light Chains                   9.66 H   0.33-1.94     mg/dL   Lambda Qnt Free Light Chains                  0.73     0.57-2.63     mg/dL   Kappa/Lambda Free Light Chain Ratio          13.23 H   0.26-1.65     08/02/2018:  [place] Qnt Free Light Chains                   8.90 H   0.33-1.94     mg/dL   Lambda Qnt Free Light Chains                  0.65     0. 57-2.63     mg/dL   Kappa/Lambda Free Light Chain Ratio          13.69 H   0.26-1.65     02/01/2019:  [place] Qnt Free Light Chains                   6.95    Lambda Qnt Free Light Chains                  0.58      [place]/Lambda Free Light Chain Ratio          11.98     08/05/2019:  [place] Qnt Free Light Chains                   6.25    Lambda Qnt Free Light Chains                  0.61      [place]/Lambda Free Light Chain Ratio     10.25      02/06/2020  [place] Qnt Free Light Chains                  15.90    [place] Qnt Free Light Chains                0.72        [place]/Lambda Free Light Chain Ratio    22.08 H       August 21, 2020:  [place] Qnt Free Light Chains                    64.30 H      [place] Qnt Free Light Chains                  6.36       [place]/Lambda Free Light Chain Ratio       10.11 H        December 8, 2020:     [place] Qnt Free Light Chains    73.81 H      .   Lambda Qnt Free Light Chains    8.10        [place]/Lambda Free Light Chain Ratio      9.11 H      April 9, 2021:   [place] Qnt Free Light Chains                  78.17 H   3.30-19.40    mg/L   [place] Qnt Free Light Chains                  5.13 L   5.71-26.30    mg/L   Kappa/Lambda Free Light Chain Ratio    15.24 H   0.26-1.65     September 15, 2021:  [place] Qnt Free Light Chains                  74.44 H   3.30-19.40    mg/L   Lambda Qnt Free Light Chains                  4.38 L   5.71-26.30    mg/L   Kappa/Lambda Free Light Chain Ratio          17.00 H   0.26-1.65  Bone marrow biopsy on 09/23/2016 showed:      Normocellular bone marrow (60%) with orderly trilineage      hematopoiesis and increased mature plasma cells (17%).     Kappa restricted plasma cells were demonstrated by flow cytometric      analysis. Morphologic and ancillary studies are consistent with a plasma cell      neoplasm (dyscrasia). FISH results were NORMAL    Bone scan on September 15, 2020 showed:  1. No scintigraphic evidence of osseous metastatic disease.    2. Probable degenerative arthropathic changes as detailed above           Assessment/Plan:        Diagnosis Orders   1. Smoldering multiple myeloma ([facility] [address]          1. Smoldering Multiple Myeloma (SMM):  This is a 80-year-old male with smoldering multiple myeloma diagnosed in June 2016. He underwent immunofixation electrophoresis that showed M-spike in the gamma region. The monoclonal protein peak accounted for 3.17 g/dL of the total 3.34 g/dL of protein in the gamma region. Bone marrow biopsy showed increased mature plasma cells (17%). No suspicious osseous lesions were identified on the bone survey. Patient continues to be asymptomatic, he denies having any B symptoms, no bone pain, no peripheral neuropathy. His M protein in September 2021 was stable, however his kappa free light chain was trending up, [place]/Lambda Free Light Chain Ratio remained at around 10. His hemoglobin, creatinine and calcium were within normal limits. The patient had a bone scan in September 2020 that was negative for evidence of lytic or sclerotic lesions. We continue surveillance. His M protein in September 2021 remains stable. [place] free light chain is stable as well. We will continue monitoring  All patient questions answered. Patient agreed with treatment plan. Follow up as directed.

## 2021-10-11 ENCOUNTER — TELEPHONE (OUTPATIENT)
Dept: FAMILY MEDICINE CLINIC | Age: 46
End: 2021-10-11

## 2021-10-11 ENCOUNTER — HOSPITAL ENCOUNTER (EMERGENCY)
Age: 46
Discharge: HOME | End: 2021-10-11
Payer: COMMERCIAL

## 2021-10-11 VITALS
OXYGEN SATURATION: 98 % | TEMPERATURE: 98.1 F | SYSTOLIC BLOOD PRESSURE: 132 MMHG | RESPIRATION RATE: 16 BRPM | HEART RATE: 97 BPM | DIASTOLIC BLOOD PRESSURE: 86 MMHG

## 2021-10-11 DIAGNOSIS — J40 BRONCHITIS: Primary | ICD-10-CM

## 2021-10-11 PROCEDURE — 99213 OFFICE O/P EST LOW 20 MIN: CPT | Performed by: NURSE PRACTITIONER

## 2021-10-11 PROCEDURE — 99213 OFFICE O/P EST LOW 20 MIN: CPT

## 2021-10-11 RX ORDER — CETIRIZINE HYDROCHLORIDE 10 MG/1
10 TABLET ORAL DAILY
Qty: 30 TABLET | Refills: 0 | Status: SHIPPED | OUTPATIENT
Start: 2021-10-11 | End: 2021-11-10

## 2021-10-11 RX ORDER — AMOXICILLIN AND CLAVULANATE POTASSIUM 875; 125 MG/1; MG/1
1 TABLET, FILM COATED ORAL 2 TIMES DAILY
Qty: 14 TABLET | Refills: 0 | Status: SHIPPED | OUTPATIENT
Start: 2021-10-11 | End: 2021-10-18

## 2021-10-11 RX ORDER — FLUTICASONE PROPIONATE 50 MCG
1 SPRAY, SUSPENSION (ML) NASAL DAILY
Qty: 16 G | Refills: 0 | Status: SHIPPED | OUTPATIENT
Start: 2021-10-11 | End: 2022-01-21

## 2021-10-11 RX ORDER — PREDNISONE 20 MG/1
20 TABLET ORAL DAILY
Qty: 5 TABLET | Refills: 0 | Status: SHIPPED | OUTPATIENT
Start: 2021-10-11 | End: 2021-10-16

## 2021-10-11 RX ORDER — BROMPHENIRAMINE MALEATE, PSEUDOEPHEDRINE HYDROCHLORIDE, AND DEXTROMETHORPHAN HYDROBROMIDE 2; 30; 10 MG/5ML; MG/5ML; MG/5ML
10 SYRUP ORAL 4 TIMES DAILY PRN
Qty: 118 ML | Refills: 1 | Status: SHIPPED | OUTPATIENT
Start: 2021-10-11 | End: 2022-01-21

## 2021-10-11 ASSESSMENT — ENCOUNTER SYMPTOMS
NAUSEA: 0
CHEST TIGHTNESS: 0
RHINORRHEA: 1
VOMITING: 0
COUGH: 1
DIARRHEA: 0
SORE THROAT: 1
SHORTNESS OF BREATH: 0

## 2021-10-11 ASSESSMENT — PAIN DESCRIPTION - LOCATION: LOCATION: THROAT

## 2021-10-11 ASSESSMENT — PAIN SCALES - GENERAL: PAINLEVEL_OUTOF10: 8

## 2021-10-11 NOTE — Clinical Note
Socrates Ahn was seen and treated in our emergency department on 10/11/2021. He may return to work on 10/14/2021. Off work one to two days if needed     If you have any questions or concerns, please don't hesitate to call.       Josiah Scott, APRN - CNP

## 2021-10-11 NOTE — TELEPHONE ENCOUNTER
Patient called wanting to be seen. Offered patient appointment for Thursday. Patient upset that I could not get him in sooner. Recommended Urgent Care for patient.

## 2021-10-11 NOTE — ED PROVIDER NOTES
[facility]  Urgent Care Encounter       CHIEF COMPLAINT       Chief Complaint   Patient presents with    Cough       Nurses Notes reviewed and I agree except as noted in the HPI. HISTORY OF PRESENT ILLNESS   Natalie Cnonor is a 39 y.o. male who presents to the [facility] urgent care for evaluation of cough. Patient reports this cough started Tuesday, 6 days ago. He reports associated symptoms of pharyngitis, body aches, rhinorrhea, and postnasal drainage. He does report that he was tested twice for Covid Thursday and Saturday, which were both negative. He denies chills, but reports he felt warm. He denies loss of taste or smell. He denies nausea, vomiting, diarrhea. He denies headache or nasal congestion. He denies known exposure to someone ill. The history is provided by the patient. No  was used. REVIEW OF SYSTEMS     Review of Systems   Constitutional: Negative for activity change, appetite change, chills, fatigue and fever. HENT: Positive for postnasal drip, rhinorrhea and sore throat. Negative for ear discharge and ear pain. Respiratory: Positive for cough. Negative for chest tightness and shortness of breath. Cardiovascular: Negative for chest pain. Gastrointestinal: Negative for diarrhea, nausea and vomiting. Genitourinary: Negative for dysuria. Musculoskeletal: Positive for myalgias. Skin: Negative for rash. Allergic/Immunologic: Negative for environmental allergies and food allergies. Neurological: Negative for dizziness and headaches. PAST MEDICAL HISTORY         Diagnosis Date    Smoldering multiple myeloma ([facility] [address] 10/01/2016    Multiple Myeloma        SURGICALHISTORY     Patient  has a past surgical history that includes bone marrow biopsy (09/23/2016).     CURRENT MEDICATIONS       Discharge Medication List as of 10/11/2021  5:11 PM      CONTINUE these medications which have NOT CHANGED    Details   NONFORMULARY Historical Med      vitamin D (CHOLECALCIFEROL) 50 MCG (2000 UT) TABS tablet Take 1 tablet by mouth daily, Disp-100 tablet, R-3Normal             ALLERGIES     Patient is has No Known Allergies. Patients   Immunization History   Administered Date(s) Administered    COVID-19, Moderna, PF, 100mcg/0.5mL 02/19/2021, 03/19/2021, 09/24/2021    Hepatitis A 11/17/2016, 05/19/2017    Hepatitis B (Recombivax HB) 11/17/2016, 01/20/2017, 05/19/2017    Influenza Virus Vaccine 11/01/2017, 10/18/2020, 09/24/2021    Influenza, Quadv, IM, (6 mo and older Fluzone, Flulaval, Fluarix and 3 yrs and older Afluria) 09/20/2018, 01/03/2020    Influenza, Quadv, IM, PF (6 mo and older Fluzone, Flulaval, Fluarix, and 3 yrs and older Afluria) 09/14/2016    Pneumococcal Conjugate 13-valent ([id number]) 11/17/2016    Pneumococcal Polysaccharide ([id number]) 11/17/2017    Tdap (Boostrix, Adacel) 09/14/2016       FAMILY HISTORY     Patient's family history includes Asthma in his father; High Blood Pressure in his father and mother; Other in his mother. SOCIAL HISTORY     Patient  reports that he quit smoking about 13 years ago. His smoking use included cigarettes. He has a 5.00 pack-year smoking history. He has never used smokeless tobacco. He reports current alcohol use. He reports that he does not use drugs. PHYSICAL EXAM     ED TRIAGE VITALS  BP: 132/86, Temp: 98.1 °F (36.7 °C), Pulse: 97, Resp: 16, SpO2: 98 %,Estimated body mass index is 34.76 kg/m² as calculated from the following:    Height as of 9/24/21: 5' 3\" (1.6 m). Weight as of 9/24/21: 196 lb 3.2 oz (89 kg). ,No LMP for male patient. Physical Exam  Vitals and nursing note reviewed. Constitutional:       General: He is not in acute distress. Appearance: Normal appearance. He is not ill-appearing, toxic-appearing or diaphoretic. HENT:      Head: Normocephalic.       Right Ear: Ear canal and external ear normal.      Left Ear: Ear canal and external ear normal.      Nose: Nose normal. No congestion or rhinorrhea. Mouth/Throat:      Mouth: Mucous membranes are moist.      Pharynx: Oropharynx is clear. Posterior oropharyngeal erythema present. No oropharyngeal exudate. Cardiovascular:      Rate and Rhythm: Normal rate. Pulses: Normal pulses. Pulmonary:      Effort: Pulmonary effort is normal. No respiratory distress. Breath sounds: No stridor. No wheezing or rhonchi. Abdominal:      General: Abdomen is flat. Bowel sounds are normal.      Palpations: Abdomen is soft. Musculoskeletal:         General: No swelling or tenderness. Normal range of motion. Cervical back: Normal range of motion. Neurological:      General: No focal deficit present. Mental Status: He is alert and oriented to person, place, and time. Psychiatric:         Mood and Affect: Mood normal.         Behavior: Behavior normal.         DIAGNOSTIC RESULTS     Labs:No results found for this visit on 10/11/21. IMAGING:    No orders to display         EKG: None      URGENT CARE COURSE:     Vitals:    10/11/21 1643   BP: 132/86   Pulse: 97   Resp: 16   Temp: 98.1 °F (36.7 °C)   TempSrc: Temporal   SpO2: 98%       Medications - No data to display         PROCEDURES:  None    FINAL IMPRESSION      1. Bronchitis          DISPOSITION/ PLAN     Patient seen and evaluated for cough. History and assessment consistent with likely bronchitis. He is prescribed Augmentin, prednisone, Flonase, Zyrtec, and Bromfed-DM. He is directed to use over-the-counter Tylenol and Motrin for pain or fever. He is instructed to follow-up with his PCP in 3 to 5 days with new or worsening symptoms. He is agreeable with the above plan and denies questions or concerns at this time.       PATIENT REFERRED TO:  Bel Melendez MD  [address] [place] [address] / [facility] [id number]      DISCHARGE MEDICATIONS:  Discharge Medication List as of 10/11/2021  5:11 PM      START taking these medications    Details brompheniramine-pseudoephedrine-DM 2-30-10 MG/5ML syrup Take 10 mLs by mouth 4 times daily as needed for Congestion or Cough, Disp-118 mL, R-1Normal      cetirizine (ZYRTEC) 10 MG tablet Take 1 tablet by mouth daily, Disp-30 tablet, R-0Normal      fluticasone (FLONASE) 50 MCG/ACT nasal spray 1 spray by Each Nostril route daily, Disp-16 g, R-0Normal      predniSONE (DELTASONE) 20 MG tablet Take 1 tablet by mouth daily for 5 days, Disp-5 tablet, R-0Normal      amoxicillin-clavulanate (AUGMENTIN) 875-125 MG per tablet Take 1 tablet by mouth 2 times daily for 7 days, Disp-14 tablet, R-0Normal             Discharge Medication List as of 10/11/2021  5:11 PM          Discharge Medication List as of 10/11/2021  5:11 PM          YI Del Toro CNP    (Please note that portions of this note were completed with a voice recognition program. Efforts were made to edit the dictations but occasionally words are mis-transcribed.)           YI Del Toro CNP  10/11/21 3729

## 2021-10-13 ENCOUNTER — TELEPHONE (OUTPATIENT)
Dept: FAMILY MEDICINE CLINIC | Age: 46
End: 2021-10-13

## 2021-10-13 NOTE — LETTER
[address]  [address] [facility] , [address]  Phone: [phone]  Fax: [phone]    October 13, 2021    [facility] [address]      Dear Sarah Simental,    This letter is regarding your Emergency Department (ED) visit at [address] on 10/11/21. Lewis Romo wanted to make sure that you understand your discharge instructions and that you were able to fill any prescriptions that may have been ordered for you. Please contact the office at the above phone number if the ED advised to you follow up with Lewis Romo, or if you have any further questions or needs. Also did you know -   *Visiting the ED for a non-emergency could result in higher co-pays than you would normally be subject to paying? *You can call your doctor even after hours so they can direct you to the most appropriate care. [facility]) practices can often offer you an appointment on the same day that you call. *We have some [facility] offices that offer Walk-in appointments; check our website for availability in your community, [email or web address].      *Evisits are now available for patients for $36 through [employer] for certain conditions:  * Sinus, cold and or cough       * Diarrhea            * Headache  * Heartburn                                * Poison Armida          * Back pain     * Urinary problems                         If you do not have [employer] and are interested, please contact the office and a staff member may assist you or go to [email or web address].     Sincerely,   Pooja Momin MD and your [facility]

## 2022-01-13 DIAGNOSIS — D47.2 SMOLDERING MULTIPLE MYELOMA: Primary | ICD-10-CM

## 2022-01-14 ENCOUNTER — HOSPITAL ENCOUNTER (OUTPATIENT)
Age: 47
Discharge: HOME | End: 2022-01-14
Payer: COMMERCIAL

## 2022-01-14 DIAGNOSIS — D47.2 SMOLDERING MULTIPLE MYELOMA: ICD-10-CM

## 2022-01-14 LAB
ABSOLUTE IMMATURE GRANULOCYTE: 0.01 THOU/MM3 (ref 0–0.07)
ALBUMIN SERPL-MCNC: 4.3 G/DL (ref 3.5–5.1)
ALP BLD-CCNC: 65 U/L (ref 38–126)
ALT SERPL-CCNC: 39 U/L (ref 11–66)
AST SERPL-CCNC: 30 U/L (ref 5–40)
BASINOPHIL, AUTOMATED: 1 % (ref 0–3)
BASOPHILS ABSOLUTE: 0 THOU/MM3 (ref 0–0.1)
BILIRUB SERPL-MCNC: 0.2 MG/DL (ref 0.3–1.2)
BILIRUBIN DIRECT: < 0.2 MG/DL (ref 0–0.3)
BUN, WHOLE BLOOD: 14 MG/DL (ref 8–26)
CHLORIDE, WHOLE BLOOD: 105 MEQ/L (ref 98–109)
CREATININE, WHOLE BLOOD: 0.9 MG/DL (ref 0.5–1.2)
EOSINOPHILS ABSOLUTE: 0.2 THOU/MM3 (ref 0–0.4)
EOSINOPHILS RELATIVE PERCENT: 3 % (ref 0–4)
GFR, ESTIMATED: > 90 ML/MIN/1.73M2
GLUCOSE, WHOLE BLOOD: 90 MG/DL (ref 70–108)
HCT VFR BLD CALC: 43.9 % (ref 42–52)
HEMOGLOBIN: 13.5 GM/DL (ref 14–18)
IMMATURE GRANULOCYTES: 0 %
IONIZED CALCIUM, WHOLE BLOOD: 1.19 MMOL/L (ref 1.12–1.32)
LYMPHOCYTES # BLD: 41 % (ref 15–47)
LYMPHOCYTES ABSOLUTE: 2.5 THOU/MM3 (ref 1–4.8)
MCH RBC QN AUTO: 27.6 PG (ref 26–33)
MCHC RBC AUTO-ENTMCNC: 30.8 GM/DL (ref 32.2–35.5)
MCV RBC AUTO: 90 FL (ref 80–94)
MONOCYTES ABSOLUTE: 0.9 THOU/MM3 (ref 0.4–1.3)
MONOCYTES: 14 % (ref 0–12)
PDW BLD-RTO: 14.3 % (ref 11.5–14.5)
PLATELET # BLD: 243 THOU/MM3 (ref 130–400)
PMV BLD AUTO: 10.6 FL (ref 9.4–12.4)
POTASSIUM, WHOLE BLOOD: 4 MEQ/L (ref 3.5–4.9)
RBC # BLD: 4.89 MILL/MM3 (ref 4.7–6.1)
SEG NEUTROPHILS: 41 % (ref 43–75)
SEGMENTED NEUTROPHILS ABSOLUTE COUNT: 2.5 THOU/MM3 (ref 1.8–7.7)
SODIUM, WHOLE BLOOD: 140 MEQ/L (ref 138–146)
TOTAL CO2, WHOLE BLOOD: 26 MEQ/L (ref 23–33)
TOTAL PROTEIN: 9.4 G/DL (ref 6.1–8)
WBC # BLD: 6 THOU/MM3 (ref 4.8–10.8)

## 2022-01-14 PROCEDURE — 36415 COLL VENOUS BLD VENIPUNCTURE: CPT

## 2022-01-14 PROCEDURE — 82784 ASSAY IGA/IGD/IGG/IGM EACH: CPT

## 2022-01-14 PROCEDURE — 84165 PROTEIN E-PHORESIS SERUM: CPT

## 2022-01-14 PROCEDURE — 80047 BASIC METABLC PNL IONIZED CA: CPT

## 2022-01-14 PROCEDURE — 80076 HEPATIC FUNCTION PANEL: CPT

## 2022-01-14 PROCEDURE — 83883 ASSAY NEPHELOMETRY NOT SPEC: CPT

## 2022-01-14 PROCEDURE — 85025 COMPLETE CBC W/AUTO DIFF WBC: CPT

## 2022-01-14 PROCEDURE — 86334 IMMUNOFIX E-PHORESIS SERUM: CPT

## 2022-01-14 PROCEDURE — 84155 ASSAY OF PROTEIN SERUM: CPT

## 2022-01-16 LAB — KAPPA/LAMBDA FREE LIGHT CHAINS: NORMAL

## 2022-01-18 LAB — IMMUNOFIXATION WITH QUANT: NORMAL

## 2022-01-20 ASSESSMENT — ENCOUNTER SYMPTOMS
EYE DISCHARGE: 0
NAUSEA: 0
TROUBLE SWALLOWING: 0
BACK PAIN: 0
CONSTIPATION: 0
COUGH: 0
BLOOD IN STOOL: 0
RECTAL PAIN: 0
DIARRHEA: 0
SORE THROAT: 0
ABDOMINAL DISTENTION: 0
CHEST TIGHTNESS: 0
VOMITING: 0
SHORTNESS OF BREATH: 0
COLOR CHANGE: 0
WHEEZING: 0
FACIAL SWELLING: 0
ABDOMINAL PAIN: 0

## 2022-01-20 NOTE — PROGRESS NOTES
[facility] PROFESSIONAL SERVS  [facility]  [address] [facility] [address]  [address] [facility] [address]  [address] [address]  Dept: [phone]  Dept Fax: [phone]: [phone]     Visit Date: 1/21/2022     Jacey Posey is a 55 y.o. male who presents today for:   Chief Complaint   Patient presents with    Follow-up     Smoldering multiple myeloma [facility])        DOMENICO Bullock is a 55 y.o. 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 When he applied for life insurance he was denied because his total protein and globulin were elevated on screening lab work. Therefore he underwent further testing in June 2016. The immunofixation electrophoresis showed M-spike in the gamma region. The monoclonal protein peak accounted for 3.17 g/dL of the total 3.34 g/dL of protein in  the gamma region.  PAU pattern showed an IgG type kappa monoclonal protein. The patient had a bone marrow biopsy, cytogenetic analysis and bone survey. Bone marrow biopsy showed increased mature plasma cells (17%). No suspicious osseous lesions were identified on the bone survey. His labs met criteria for SMM ( an M-protein ?3 g/dL, 17% bone marrow plasma cells and no end-organ damage). The patient is currently followed by surveillance, no active treatment    Interim history on 01/21/2022:  Patient presents to the medical oncology clinic for 4 months follow-up visit. Today, he remains asymptomatic, he denies having any B symptoms, no bone pain, no peripheral neuropathy. He denies having any fevers, no recent infections. He continues to maintain stable weight with diet and exercise. He denies being placed on any new medications since last visit.   No visits to the emergency room, no admissions to the hospital.    Family History   Problem Relation Age of Onset    High Blood Pressure Mother     Other Mother         osteoporosis    Asthma Father     High Blood Pressure Father       Social History     Tobacco Use  Smoking status: Former Smoker     Packs/day: 0.50     Years: 10.00     Pack years: 5.00     Types: Cigarettes     Quit date: 2008     Years since quittin.0    Smokeless tobacco: Never Used   Substance Use Topics    Alcohol use: Yes     Comment: occasionally      Current Outpatient Medications   Medication Sig Dispense Refill    vitamin D (CHOLECALCIFEROL) 50 MCG (2000 UT) TABS tablet Take 1 tablet by mouth daily (Patient not taking: Reported on 2022) 100 tablet 3     No current facility-administered medications for this visit. No Known Allergies   Health Maintenance   Topic Date Due    Colon cancer screen colonoscopy  Never done    COVID-19 Vaccine (2 - Moderna risk 4-dose series) 10/22/2021    Depression Screen  2022    Pneumococcal 0-64 years Vaccine (3 of 4 - PPSV23) 2022    Diabetes screen  02/15/2024    Lipid screen  2026    DTaP/Tdap/Td vaccine (2 - Td or Tdap) 2026    Flu vaccine  Completed    Hepatitis C screen  Completed    HIV screen  Completed    Hepatitis A vaccine  Aged Out    Hepatitis B vaccine  Aged Out    Hib vaccine  Aged Out    Meningococcal (ACWY) vaccine  Aged Out        Subjective:   Review of Systems   Constitutional: Negative for activity change, appetite change, fatigue and fever. HENT: Negative for congestion, dental problem, facial swelling, hearing loss, mouth sores, nosebleeds, sore throat, tinnitus and trouble swallowing. Eyes: Negative for discharge and visual disturbance. Respiratory: Negative for cough, chest tightness, shortness of breath and wheezing. Cardiovascular: Negative for chest pain, palpitations and leg swelling. Gastrointestinal: Negative for abdominal distention, abdominal pain, blood in stool, constipation, diarrhea, nausea, rectal pain and vomiting. Endocrine: Negative for cold intolerance, polydipsia and polyuria.    Genitourinary: Negative for decreased urine volume, difficulty urinating, dysuria, flank pain, hematuria and urgency. Musculoskeletal: Negative for arthralgias, back pain, gait problem, joint swelling, myalgias and neck stiffness. Skin: Negative for color change, rash and wound. Neurological: Negative for dizziness, tremors, seizures, speech difficulty, weakness, light-headedness, numbness and headaches. Hematological: Negative for adenopathy. Does not bruise/bleed easily. Psychiatric/Behavioral: Negative for confusion and sleep disturbance. The patient is not nervous/anxious. Objective:   Physical Exam  Vitals reviewed. Constitutional:       General: He is not in acute distress. Appearance: He is well-developed. HENT:      Head: Normocephalic. Mouth/Throat:      Pharynx: No oropharyngeal exudate. Eyes:      General: No scleral icterus. Right eye: No discharge. Left eye: No discharge. Pupils: Pupils are equal, round, and reactive to light. Neck:      Thyroid: No thyromegaly. Vascular: No JVD. Trachea: No tracheal deviation. Cardiovascular:      Rate and Rhythm: Normal rate. Heart sounds: Normal heart sounds. No murmur heard. No friction rub. No gallop. Pulmonary:      Effort: Pulmonary effort is normal. No respiratory distress. Breath sounds: Normal breath sounds. No stridor. No wheezing or rales. Chest:      Chest wall: No tenderness. Abdominal:      General: Bowel sounds are normal. There is no distension. Palpations: Abdomen is soft. There is no mass. Tenderness: There is no abdominal tenderness. There is no rebound. Musculoskeletal:         General: Normal range of motion. Cervical back: Normal range of motion and neck supple. Lymphadenopathy:      Cervical: No cervical adenopathy. Skin:     General: Skin is warm. Findings: No erythema or rash. Neurological:      Mental Status: He is alert and oriented to person, place, and time.       Cranial Nerves: No cranial nerve deficit. Motor: No abnormal muscle tone. Deep Tendon Reflexes: Reflexes are normal and symmetric. Psychiatric:         Behavior: Behavior normal.         Thought Content: Thought content normal.         Judgment: Judgment normal.        /80 (Site: Right Upper Arm, Position: Sitting, Cuff Size: Medium Adult)   Pulse 79   Temp 98.9 °F (37.2 °C) (Oral)   Resp 18   Ht 5' 3\" (1.6 m)   Wt 195 lb (88.5 kg)   SpO2 98%   BMI 34.54 kg/m²      Imaging studies and labs:   Bone survey on 09/25/2016 showed:  1. No suspicious osseous lesions are identified on the bone survey. Lab Results   Component Value Date    WBC 6.0 01/14/2022    HGB 13.5 (L) 01/14/2022    HCT 43.9 01/14/2022    MCV 90 01/14/2022     01/14/2022       Chemistry        Component Value Date/Time     01/14/2022 1202     (L) 08/21/2020 1243    K 4.0 01/14/2022 1202    K 4.2 08/21/2020 1243     08/21/2020 1243    CO2 24 08/21/2020 1243    BUN 11 08/21/2020 1243    CREATININE 0.9 01/14/2022 1202    CREATININE 0.7 08/21/2020 1243        Component Value Date/Time    CALCIUM 9.0 08/21/2020 1243    ALKPHOS 65 01/14/2022 1201    AST 30 01/14/2022 1201    ALT 39 01/14/2022 1201    BILITOT 0.2 (L) 01/14/2022 1201        Lab Results   Component Value Date    ALT 39 01/14/2022    AST 30 01/14/2022    ALKPHOS 65 01/14/2022    BILITOT 0.2 (L) 01/14/2022       Immunofixation on June 22, 2016 showed:   The monoclonal protein peak accounts for 3.17 g/dL of the total 3.34 g/dL of protein in the gamma region. Immunofixation on December 5, 2016 showed: The monoclonal protein peak accounts for 2.91 g/dL of the total 3.00 g/dL of protein in the gamma region. Immunofixation on August 22, 2017 showed: The monoclonal protein peak accounts for 3.57 g/dL of the total 3.70 g/dL of protein in the gamma region. Immunofixation on February 1, 2018 showed:   The monoclonal protein peak  accounts for 3.33 g/dL of the total 3.49 g/dL of protein in the gamma region. Immunofixation on 08/02/2018 showed: The monoclonal protein peak accounts for 3.22 g/dL of the total 3.38 g/dL of protein in the gamma region. Immunofixation on 02/01/2019 showed: The monoclonal protein peak accounts for 3.45 g/dL of the total 3.62 g/dL of protein in the gamma region.    Immunofixation on 08/05/2019 showed: The monoclonal protein peak accounts for 3.17 g/dL of the total 3.34 g/dL of protein in the gamma region. Immunofixation on 02/06/2020 showed: The monoclonal protein peak accounts for 3.29 g/dL of the total 3.43 g/dL of protein in the gamma region. Immunofixation on 08/21/2020 showed: The monoclonal protein peak accounts for 3.36 g/dL of the total 3.48 g/dL of protein in   the gamma region. Immunofixation on 12/08/2020 showed: The monoclonal protein peak accounts for 3.47 g/dL of the total 3.58 g/dL of protein in the gamma region  Immunofixation on 04/09/2021 showed: The monoclonal protein peak accounts for 3.15 g/dL of the total 3.23 g/dL of protein in   the gamma region. Immunofixation on 09/15/2021 showed:   The monoclonal protein peak accounts for 3.44 g/dL of the total 3.52 g/dL of protein in   the gamma region. Immunofixation on 01/14/2022 showed:   The monoclonal protein peak accounts for 3.21 g/dL of the total 3.32 g/dL of protein in   the gamma region      06/22/2016:  Do[place]un Qnt Free Light Chains                   8.03 H   0.33-1.94     mg/dL   Lambda Qnt Free Light Chains                  0.77     0.57-2.63     mg/dL   Kappa/Lambda Free Light Chain Ratio          10.43 H   0.26-1.65    12/05/2016:  Kappa Qnt Free Light Chains                   9.99 H   0.33-1.94     mg/dL   Lambda Qnt Free Light Chains                  0.85     0.57-2.63     mg/dL   Kappa/Lambda Free Light Chain Ratio          11.75 H   0.26-1.65    02/01/2018:  Kappa Qnt Free Light Chains                   9.66 H   0.33-1.94     mg/dL   Lambda Qnt Free Light Chains         were NORMAL    Bone scan on September 15, 2020 showed:  1. No scintigraphic evidence of osseous metastatic disease. 2. Probable degenerative arthropathic changes as detailed above           Assessment/Plan:        Diagnosis Orders   1. Smoldering multiple myeloma (HCC)  CBC Auto Differential    POC PANEL BMP W/IOCA    Hepatic Function Panel    Electrophoresis Protein, Serum without Reflex to Immunofixation    Immunofixation serum profile    Immunofixation, urine    Immunofixation w/ Quant        1. Smoldering Multiple Myeloma (SMM):  This is a 80-year-old male with smoldering multiple myeloma diagnosed in June 2016. He underwent immunofixation electrophoresis that showed M-spike in the gamma region. The monoclonal protein peak accounted for 3.17 g/dL of the total 3.34 g/dL of protein in the gamma region. Bone marrow biopsy showed increased mature plasma cells (17%). No suspicious osseous lesions were identified on the bone survey. Patient continues to be asymptomatic, he denies having any B symptoms, no bone pain, no peripheral neuropathy. His M protein in September 2021 was stable, however his kappa free light chain was trending up, [place]/Lambda Free Light Chain Ratio remained at around 10. His hemoglobin, creatinine and calcium were within normal limits. The patient had a bone scan in September 2020 that was negative for evidence of lytic or sclerotic lesions. We continue surveillance. His M protein in January 2022 remains stable. Sebree free light chain is stable as well. We will continue monitoring  All patient questions answered. Patient agreed with treatment plan. Follow up as directed.

## 2022-01-21 ENCOUNTER — OFFICE VISIT (OUTPATIENT)
Dept: ONCOLOGY | Age: 47
End: 2022-01-21
Payer: COMMERCIAL

## 2022-01-21 ENCOUNTER — HOSPITAL ENCOUNTER (OUTPATIENT)
Dept: INFUSION THERAPY | Age: 47
Discharge: HOME | End: 2022-01-21
Payer: COMMERCIAL

## 2022-01-21 VITALS
OXYGEN SATURATION: 98 % | DIASTOLIC BLOOD PRESSURE: 80 MMHG | WEIGHT: 195 LBS | HEART RATE: 79 BPM | HEIGHT: 63 IN | BODY MASS INDEX: 34.55 KG/M2 | SYSTOLIC BLOOD PRESSURE: 133 MMHG | RESPIRATION RATE: 18 BRPM | TEMPERATURE: 98.9 F

## 2022-01-21 DIAGNOSIS — D47.2 SMOLDERING MULTIPLE MYELOMA: Primary | ICD-10-CM

## 2022-01-21 DIAGNOSIS — C90.00 INDOLENT MULTIPLE MYELOMA (HCC): ICD-10-CM

## 2022-01-21 PROCEDURE — 99214 OFFICE O/P EST MOD 30 MIN: CPT | Performed by: INTERNAL MEDICINE

## 2022-01-21 PROCEDURE — 99211 OFF/OP EST MAY X REQ PHY/QHP: CPT

## 2022-02-25 ENCOUNTER — TELEMEDICINE (OUTPATIENT)
Dept: FAMILY MEDICINE CLINIC | Age: 47
End: 2022-02-25
Payer: COMMERCIAL

## 2022-02-25 ENCOUNTER — NURSE TRIAGE (OUTPATIENT)
Dept: OTHER | Facility: CLINIC | Age: 47
End: 2022-02-25

## 2022-02-25 DIAGNOSIS — R10.32 LLQ PAIN: Primary | ICD-10-CM

## 2022-02-25 DIAGNOSIS — K59.00 CONSTIPATION, UNSPECIFIED CONSTIPATION TYPE: ICD-10-CM

## 2022-02-25 DIAGNOSIS — K57.92 DIVERTICULITIS: ICD-10-CM

## 2022-02-25 DIAGNOSIS — K21.9 GASTROESOPHAGEAL REFLUX DISEASE, UNSPECIFIED WHETHER ESOPHAGITIS PRESENT: ICD-10-CM

## 2022-02-25 DIAGNOSIS — R11.2 NON-INTRACTABLE VOMITING WITH NAUSEA, UNSPECIFIED VOMITING TYPE: ICD-10-CM

## 2022-02-25 PROCEDURE — 99214 OFFICE O/P EST MOD 30 MIN: CPT | Performed by: NURSE PRACTITIONER

## 2022-02-25 RX ORDER — METRONIDAZOLE 500 MG/1
500 TABLET ORAL 3 TIMES DAILY
Qty: 21 TABLET | Refills: 0 | Status: SHIPPED | OUTPATIENT
Start: 2022-02-25 | End: 2022-03-04

## 2022-02-25 RX ORDER — ONDANSETRON 4 MG/1
4 TABLET, ORALLY DISINTEGRATING ORAL EVERY 8 HOURS PRN
Qty: 30 TABLET | Refills: 0 | Status: SHIPPED | OUTPATIENT
Start: 2022-02-25 | End: 2022-05-20

## 2022-02-25 RX ORDER — OMEPRAZOLE 20 MG/1
20 CAPSULE, DELAYED RELEASE ORAL
Qty: 30 CAPSULE | Refills: 5 | Status: SHIPPED | OUTPATIENT
Start: 2022-02-25 | End: 2022-09-22

## 2022-02-25 RX ORDER — CIPROFLOXACIN 500 MG/1
500 TABLET, FILM COATED ORAL 2 TIMES DAILY
Qty: 14 TABLET | Refills: 0 | Status: SHIPPED | OUTPATIENT
Start: 2022-02-25 | End: 2022-03-04

## 2022-02-25 ASSESSMENT — ENCOUNTER SYMPTOMS
CONSTIPATION: 1
ABDOMINAL PAIN: 1
ANAL BLEEDING: 0
VOMITING: 1
NAUSEA: 1
BLOOD IN STOOL: 0
DIARRHEA: 0

## 2022-02-25 NOTE — PROGRESS NOTES
Corine Santiago (:  1975) is a Established patient, here for evaluation of the following:    Assessment & Plan   Below is the assessment and plan developed based on review of pertinent history, physical exam, labs, studies, and medications. 1. LLQ pain  -     ciprofloxacin (CIPRO) 500 MG tablet; Take 1 tablet by mouth 2 times daily for 7 days, Disp-14 tablet, R-0Normal  -     metroNIDAZOLE (FLAGYL) 500 MG tablet; Take 1 tablet by mouth 3 times daily for 7 days, Disp-21 tablet, R-0Normal  -     ondansetron (ZOFRAN ODT) 4 MG disintegrating tablet; Take 1 tablet by mouth every 8 hours as needed for Nausea or Vomiting, Disp-30 tablet, R-0Normal  -     omeprazole (PRILOSEC) 20 MG delayed release capsule; Take 1 capsule by mouth every morning (before breakfast), Disp-30 capsule, R-5Normal  -     CT ABDOMEN PELVIS W WO CONTRAST Additional Contrast? Radiologist Recommendation; Future  2. Gastroesophageal reflux disease, unspecified whether esophagitis present  -     omeprazole (PRILOSEC) 20 MG delayed release capsule; Take 1 capsule by mouth every morning (before breakfast), Disp-30 capsule, R-5Normal  -     CT ABDOMEN PELVIS W WO CONTRAST Additional Contrast? Radiologist Recommendation; Future  3. Non-intractable vomiting with nausea, unspecified vomiting type  -     ondansetron (ZOFRAN ODT) 4 MG disintegrating tablet; Take 1 tablet by mouth every 8 hours as needed for Nausea or Vomiting, Disp-30 tablet, R-0Normal  -     CT ABDOMEN PELVIS W WO CONTRAST Additional Contrast? Radiologist Recommendation; Future  4. Constipation, unspecified constipation type  -     CT ABDOMEN PELVIS W WO CONTRAST Additional Contrast? Radiologist Recommendation; Future    Concern for diverticulitis, given recurrence of pain in left lower quad, along with generalized feeling of unwell including fevers and nausea. .    Return in about 4 weeks (around 3/25/2022) for follow up abdominal pain, GERD, constipation.        Subjective   HPI     hx constipation. Severe Abdominal pain, LLQ, started yesterday. Woke up fine, but then, after he got to work, he developed    fever, nausea. General feeling of unwell. Went home from work and started vomiting. Vomited 7 times, but felt somewhat better after vomiting. The pain is somewhat positional . Denies diarrhea, melena, hematochezia, or hematemesis. He has had similar, but less severe, LLQ pain about twice in the last 6 months. He also reports having some tenderness in the LLQ for a couple of days the last time he saw the hematologist and she palpated his abdomen. He reports a feeling of fullness, and food coming back up in his throat for 1 year, even if he doesn't eat soon before bed. If those symptoms are bad, he will drink some Maalox. Review of Systems   Constitutional: Positive for fever. Gastrointestinal: Positive for abdominal pain, constipation, nausea and vomiting. Negative for anal bleeding, blood in stool and diarrhea. All other systems reviewed and are negative.          Objective   Patient-Reported Vitals  No data recorded     Physical Exam  [INSTRUCTIONS:  \"[x]\" Indicates a positive item  \"[]\" Indicates a negative item  -- DELETE ALL ITEMS NOT EXAMINED]    Constitutional: [x] Appears well-developed and well-nourished [x] No apparent distress      [] Abnormal -     Mental status: [x] Alert and awake  [x] Oriented to person/place/time [x] Able to follow commands    [] Abnormal -     Eyes:   EOM    [x]  Normal    [] Abnormal -   Sclera  [x]  Normal    [] Abnormal -          Discharge [x]  None visible   [] Abnormal -     HENT: [x] Normocephalic, atraumatic  [] Abnormal -   [x] Mouth/Throat: Mucous membranes are moist    External Ears [x] Normal  [] Abnormal -    Neck: [x] No visualized mass [] Abnormal -     Pulmonary/Chest: [x] Respiratory effort normal   [x] No visualized signs of difficulty breathing or respiratory distress        [] Abnormal -      Musculoskeletal:   [x] Normal gait with no signs of ataxia         [x] Normal range of motion of neck        [] Abnormal -     Neurological:        [x] No Facial Asymmetry (Cranial nerve 7 motor function) (limited exam due to video visit)          [x] No gaze palsy        [] Abnormal -          Skin:        [x] No significant exanthematous lesions or discoloration noted on facial skin         [] Abnormal -            Psychiatric:       [x] Normal Affect [] Abnormal -        [x] No Hallucinations    Other pertinent observable physical exam findings:-                 Herbert [place], was evaluated through a synchronous (real-time) audio-video encounter. The patient (or guardian if applicable) is aware that this is a billable service, which includes applicable co-pays. This Virtual Visit was conducted with patient's (and/or legal guardian's) consent. The visit was conducted pursuant to the emergency declaration under the [facility], [address] [place] [address] authority and the [employer] General Act. Patient identification was verified, and a caregiver was present when appropriate. The patient was located at home in a state where the provider was licensed to provide care.        --Chaya Armas, YI - CNP

## 2022-02-28 ENCOUNTER — TELEPHONE (OUTPATIENT)
Dept: FAMILY MEDICINE CLINIC | Age: 47
End: 2022-02-28

## 2022-02-28 NOTE — TELEPHONE ENCOUNTER
Make sure he is taking the Prilosec as recommended, may need to increase to 40 mg daily. Check if CT scan has been able to get scheduled.

## 2022-02-28 NOTE — TELEPHONE ENCOUNTER
----- Message from Niharika Salgado sent at 2/28/2022  8:49 AM EST -----  Subject: Message to Provider    QUESTIONS  Information for Provider? Pt still having pain when he eats and he is only   on soft food. ---------------------------------------------------------------------------  --------------  [facility] INFO  What is the best way for the office to contact you? OK to leave message on   voicemail  Preferred Call Back Phone Number? [phone]  ---------------------------------------------------------------------------  --------------  SCRIPT ANSWERS  Relationship to Patient? Other  Representative Name? Rosa M wife  Additional information verified (besides Name and Date of Birth)? Address  Specialty Confirmation? Primary Care  Do you have pain that has started or worsened within the past 24 hours? No  Are you vomiting blood or have bloody or black stool? No  Have you recently (14 days) seen a provider for this pain?  Yes

## 2022-04-12 ENCOUNTER — TELEPHONE (OUTPATIENT)
Dept: FAMILY MEDICINE CLINIC | Age: 47
End: 2022-04-12

## 2022-04-12 NOTE — TELEPHONE ENCOUNTER
Spoke with wife- Chriss Lindsey regarding CT denial. Patient is still having abdominal pain with little relief using medications. Requesting to proceed with CT if possible. David Hough CNP did add an addendum to the note with more detail relating concerns and possible dx.

## 2022-04-12 NOTE — TELEPHONE ENCOUNTER
Shoshana Meadows from [address] Dept advised I would need to contact [facility] PA Dept--  [employer] # [phone]. Case # [id number]   Peer 2 Peer completed with Joao Graves CNP. Approval [id number]   4/2/22 - 5/7/22    Shoshana Meadows at [facility] PA Dept notified of approval and will document accordingly.

## 2022-04-12 NOTE — TELEPHONE ENCOUNTER
[facility] - Keenan Zachery [phone] called office regarding a denial for CT Abdomen/Pelvis  Notes: Your doctor told us that you have pain in your abdomen. Your doctor ordered a CT scan of your abdomen and pelvis. A CT scan is a way to take pictures of the inside of your body. Pain can be present due to various conditions. We reviewed the notes we have. The notes do not show what condition you might have. Based on the information we have, this test is not medically necessary. We used [facility] Guideline titled Imaging of the Abdomen and Pelvis to make this decision. You may view this guideline at [email or web address]  Case/Ref #:  [id number]      Since the testing was ordered in Feb I advised Keenan Bingham I would contact the patient regarding symptoms to make sure it's still needed. Also check with providers regarding medical necessity vs peer/peer.

## 2022-04-18 ENCOUNTER — OFFICE VISIT (OUTPATIENT)
Dept: FAMILY MEDICINE CLINIC | Age: 47
End: 2022-04-18
Payer: COMMERCIAL

## 2022-04-18 VITALS
HEART RATE: 74 BPM | BODY MASS INDEX: 33.84 KG/M2 | SYSTOLIC BLOOD PRESSURE: 131 MMHG | WEIGHT: 191 LBS | HEIGHT: 63 IN | DIASTOLIC BLOOD PRESSURE: 86 MMHG

## 2022-04-18 DIAGNOSIS — D64.9 LOW HEMOGLOBIN: ICD-10-CM

## 2022-04-18 DIAGNOSIS — Z12.11 COLON CANCER SCREENING: ICD-10-CM

## 2022-04-18 DIAGNOSIS — R10.32 LEFT LOWER QUADRANT ABDOMINAL PAIN: Primary | ICD-10-CM

## 2022-04-18 DIAGNOSIS — Z13.220 LIPID SCREENING: ICD-10-CM

## 2022-04-18 PROCEDURE — 99214 OFFICE O/P EST MOD 30 MIN: CPT | Performed by: FAMILY MEDICINE

## 2022-04-18 SDOH — ECONOMIC STABILITY: FOOD INSECURITY: WITHIN THE PAST 12 MONTHS, YOU WORRIED THAT YOUR FOOD WOULD RUN OUT BEFORE YOU GOT MONEY TO BUY MORE.: NEVER TRUE

## 2022-04-18 SDOH — ECONOMIC STABILITY: FOOD INSECURITY: WITHIN THE PAST 12 MONTHS, THE FOOD YOU BOUGHT JUST DIDN'T LAST AND YOU DIDN'T HAVE MONEY TO GET MORE.: NEVER TRUE

## 2022-04-18 ASSESSMENT — PATIENT HEALTH QUESTIONNAIRE - PHQ9
SUM OF ALL RESPONSES TO PHQ QUESTIONS 1-9: 0
2. FEELING DOWN, DEPRESSED OR HOPELESS: 0
SUM OF ALL RESPONSES TO PHQ9 QUESTIONS 1 & 2: 0
SUM OF ALL RESPONSES TO PHQ QUESTIONS 1-9: 0
SUM OF ALL RESPONSES TO PHQ QUESTIONS 1-9: 0
1. LITTLE INTEREST OR PLEASURE IN DOING THINGS: 0
SUM OF ALL RESPONSES TO PHQ QUESTIONS 1-9: 0

## 2022-04-18 ASSESSMENT — SOCIAL DETERMINANTS OF HEALTH (SDOH): HOW HARD IS IT FOR YOU TO PAY FOR THE VERY BASICS LIKE FOOD, HOUSING, MEDICAL CARE, AND HEATING?: NOT HARD AT ALL

## 2022-04-18 NOTE — PROGRESS NOTES
[address] [facility] [address] [facility], [address]  Ph:   [phone]  Fax: [phone]    Sonu Cadena MD    Patient:  Nick Hess  YOB: 1975      Visit Date:  4/18/2022     Reason For Visit:   Chief Complaint   Patient presents with    GI Problem     PAIN; BEANS ARE GIVING GAS AND PAIN         Naren Plants is a 55 y.o. male who comes today to the office for follow up of diverticulitis. February 25 was seen by my nurse practitioner, Meme Xiao CNP due to left sided abdominal pain, N/V, fever and was diagnosed with acute diverticulitis and given Flagyl and Cipro. She odered a CT scan of abdomen and pelvis which has not been done yet. His symptoms improved and the fever and N/V resolved. Since then he continues to have mild abdominal pain in the left lower quadrant, mainly after he eats his large meal of the day. He also feels more distended and gassy. His BM's are normal, daily, no constipation. Denies any changes in his bowel habits or blood in the stools. He has hx of smoldering MM and follows up with Dr. Felecia Gowers who is going to retire soon. Overall he is doing well without any symptoms. His CBC has shown a slow decrease in hemoglobin in the last several blood test. He denies any fatigue      Significant Past Medical History:    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 10/01/2016    Multiple Myeloma            Allergies:  has No Known Allergies.     Medications:   Current Outpatient Medications   Medication Sig Dispense Refill    ondansetron (ZOFRAN ODT) 4 MG disintegrating tablet Take 1 tablet by mouth every 8 hours as needed for Nausea or Vomiting 30 tablet 0    omeprazole (PRILOSEC) 20 MG delayed release capsule Take 1 capsule by mouth every morning (before breakfast) 30 capsule 5    vitamin D (CHOLECALCIFEROL) 50 MCG (2000 UT) TABS tablet Take 1 tablet by mouth daily 100 tablet 3     No current facility-administered medications for this visit. Review of systems:  Review of Systems - History obtained from chart review and the patient  General ROS: negative for - chills, fatigue or fever  Psychological ROS: negative for - anxiety, depression or sleep disturbances  ENT ROS: negative for - headaches, nasal congestion or nasal discharge  Allergy and Immunology ROS: negative for - seasonal allergies  Hematological and Lymphatic ROS: negative for - bruising  Endocrine ROS: negative for - malaise/lethargy, polydipsia/polyuria or temperature intolerance  Respiratory ROS: negative for - cough,  shortness of breath or wheezing  Cardiovascular ROS: negative for - chest pain or edema  Gastrointestinal ROS: positive for - abdominal pain. Denies constipation, diarrhea or nausea/vomiting  Musculoskeletal ROS: negative for - joint pain or muscle pain  Neurological ROS: negative for - dizziness  Dermatological ROS: negative for - rash    Physical Examination:  /86   Pulse 74   Ht 5' 3\" (1.6 m)   Wt 191 lb (86.6 kg)   BMI 33.83 kg/m²     General-  Alert and oriented x 3, NAD  HEENT: NC, AT, PERRLA, EOMI, anicteric sclerae  Ears: Normal tympanic membranes bilaterally  Nose: patent, no lesions  Mouth: no lesions, moist mucosas  Neck - supple, no significant adenopathy  Chest - clear to auscultation, no wheezes, rales or rhonchi, symmetric air entry  Heart - normal rate, regular rhythm, normal S1, S2, no murmurs, rubs, clicks or gallops  Abdomen - soft, nontender, nondistended, no masses or organomegaly  Extremities -  no pedal edema, no clubbing or cyanosis    Impression:   Diagnosis Orders   1. Left lower quadrant abdominal pain  CBC with Auto Differential    Comprehensive Metabolic Panel   2. Colon cancer screening  CAMILLA - Pollo Guerra MD, Gastroenterology, [facility]   3. Lipid screening  Lipid Panel   4. Low hemoglobin     5.  BMI 33.0-33.9,adult         Plan:  Do CT abdomen as recommended  Refer for colon cancer screening  Do blood test, including CBC  Lipid screening  Wt reduction recomended    Orders Placed This Encounter   Procedures    CBC with Auto Differential     Standing Status:   Future     Standing Expiration Date:   4/18/2023    Lipid Panel     Standing Status:   Future     Standing Expiration Date:   4/18/2023     Order Specific Question:   Is Patient Fasting?/# of Hours     Answer:   12 hours    Comprehensive Metabolic Panel     Standing Status:   Future     Standing Expiration Date:   4/18/2023   India Yee MD, Gastroenterology, [facility]     Referral Priority:   Routine     Referral Type:   Eval and Treat     Referral Reason:   Specialty Services Required     Referred to Provider:   Lilia Tracy MD     Requested Specialty:   Gastroenterology     Number of Visits Requested:   1     No orders of the defined types were placed in this encounter. Follow Up:  Return in about 6 months (around 10/18/2022).     Margarita Ghosh MD

## 2022-04-22 ENCOUNTER — HOSPITAL ENCOUNTER (OUTPATIENT)
Dept: CT IMAGING | Age: 47
Discharge: HOME | End: 2022-04-22
Payer: COMMERCIAL

## 2022-04-22 DIAGNOSIS — K59.00 CONSTIPATION, UNSPECIFIED CONSTIPATION TYPE: ICD-10-CM

## 2022-04-22 DIAGNOSIS — R11.2 NON-INTRACTABLE VOMITING WITH NAUSEA, UNSPECIFIED VOMITING TYPE: ICD-10-CM

## 2022-04-22 DIAGNOSIS — K21.9 GASTROESOPHAGEAL REFLUX DISEASE, UNSPECIFIED WHETHER ESOPHAGITIS PRESENT: ICD-10-CM

## 2022-04-22 DIAGNOSIS — R91.1 INCIDENTAL PULMONARY NODULE, > 3MM AND < 8MM: Primary | ICD-10-CM

## 2022-04-22 DIAGNOSIS — R10.32 LLQ PAIN: ICD-10-CM

## 2022-04-22 DIAGNOSIS — K57.92 DIVERTICULITIS: ICD-10-CM

## 2022-04-22 PROCEDURE — 6360000004 HC RX CONTRAST MEDICATION: Performed by: NURSE PRACTITIONER

## 2022-04-22 PROCEDURE — 74177 CT ABD & PELVIS W/CONTRAST: CPT

## 2022-04-22 RX ADMIN — IOPAMIDOL 85 ML: 755 INJECTION, SOLUTION INTRAVENOUS at 12:51

## 2022-04-22 RX ADMIN — IOHEXOL 50 ML: 240 INJECTION, SOLUTION INTRATHECAL; INTRAVASCULAR; INTRAVENOUS; ORAL at 12:51

## 2022-04-25 ENCOUNTER — TELEPHONE (OUTPATIENT)
Dept: FAMILY MEDICINE CLINIC | Age: 47
End: 2022-04-25

## 2022-04-25 NOTE — TELEPHONE ENCOUNTER
----- Message from YI Devries CNP sent at 4/22/2022  2:07 PM EDT -----  CT abdomen pelvis found incidental 6 mm nodule of the left lower lung, and CT of the chest is recommended for  further evaluation; I will order. Further follow-up dependent on findingsOtherwise showed uncomplicated left-sided colonic diverticulosis, no other acute abdominal or pelvic abnormalities. I see Dr. Whitney Last ordered some blood work at his last visit, as well as a referral to GI for colon cancer screening.

## 2022-04-25 NOTE — TELEPHONE ENCOUNTER
Spoke to the patient regarding results. Verbalized understanding. Transferred to [employer] at [facility] to set up [address]. No additional questions at this time.

## 2022-05-11 ENCOUNTER — HOSPITAL ENCOUNTER (OUTPATIENT)
Age: 47
Discharge: HOME | End: 2022-05-11
Payer: COMMERCIAL

## 2022-05-11 DIAGNOSIS — D47.2 SMOLDERING MULTIPLE MYELOMA: ICD-10-CM

## 2022-05-11 LAB
ABSOLUTE IMMATURE GRANULOCYTE: 0.01 THOU/MM3 (ref 0–0.07)
ALBUMIN SERPL-MCNC: 4.1 G/DL (ref 3.5–5.1)
ALP BLD-CCNC: 66 U/L (ref 38–126)
ALT SERPL-CCNC: 33 U/L (ref 11–66)
AST SERPL-CCNC: 30 U/L (ref 5–40)
BASINOPHIL, AUTOMATED: 0 % (ref 0–3)
BASOPHILS ABSOLUTE: 0 THOU/MM3 (ref 0–0.1)
BILIRUB SERPL-MCNC: 0.2 MG/DL (ref 0.3–1.2)
BILIRUBIN DIRECT: < 0.2 MG/DL (ref 0–0.3)
BUN, WHOLE BLOOD: 12 MG/DL (ref 8–26)
CHLORIDE, WHOLE BLOOD: 107 MEQ/L (ref 98–109)
CREATININE, WHOLE BLOOD: 1.1 MG/DL (ref 0.5–1.2)
EOSINOPHILS ABSOLUTE: 0.1 THOU/MM3 (ref 0–0.4)
EOSINOPHILS RELATIVE PERCENT: 2 % (ref 0–4)
GFR, ESTIMATED: 76 ML/MIN/1.73M2
GLUCOSE, WHOLE BLOOD: 100 MG/DL (ref 70–108)
HCT VFR BLD CALC: 42.1 % (ref 42–52)
HEMOGLOBIN: 13.3 GM/DL (ref 14–18)
IMMATURE GRANULOCYTES: 0 %
IONIZED CALCIUM, WHOLE BLOOD: 1.19 MMOL/L (ref 1.12–1.32)
LYMPHOCYTES # BLD: 39 % (ref 15–47)
LYMPHOCYTES ABSOLUTE: 2.4 THOU/MM3 (ref 1–4.8)
MCH RBC QN AUTO: 27.8 PG (ref 26–33)
MCHC RBC AUTO-ENTMCNC: 31.6 GM/DL (ref 32.2–35.5)
MCV RBC AUTO: 88 FL (ref 80–94)
MONOCYTES ABSOLUTE: 0.5 THOU/MM3 (ref 0.4–1.3)
MONOCYTES: 9 % (ref 0–12)
PDW BLD-RTO: 14.3 % (ref 11.5–14.5)
PLATELET # BLD: 235 THOU/MM3 (ref 130–400)
PMV BLD AUTO: 10.7 FL (ref 9.4–12.4)
POTASSIUM, WHOLE BLOOD: 4 MEQ/L (ref 3.5–4.9)
RBC # BLD: 4.79 MILL/MM3 (ref 4.7–6.1)
SEG NEUTROPHILS: 50 % (ref 43–75)
SEGMENTED NEUTROPHILS ABSOLUTE COUNT: 3.1 THOU/MM3 (ref 1.8–7.7)
SODIUM, WHOLE BLOOD: 142 MEQ/L (ref 138–146)
TOTAL CO2, WHOLE BLOOD: 26 MEQ/L (ref 23–33)
TOTAL PROTEIN: 9.1 G/DL (ref 6.1–8)
WBC # BLD: 6.2 THOU/MM3 (ref 4.8–10.8)

## 2022-05-11 PROCEDURE — 80076 HEPATIC FUNCTION PANEL: CPT

## 2022-05-11 PROCEDURE — 80047 BASIC METABLC PNL IONIZED CA: CPT

## 2022-05-11 PROCEDURE — 84155 ASSAY OF PROTEIN SERUM: CPT

## 2022-05-11 PROCEDURE — 86335 IMMUNFIX E-PHORSIS/URINE/CSF: CPT

## 2022-05-11 PROCEDURE — 84165 PROTEIN E-PHORESIS SERUM: CPT

## 2022-05-11 PROCEDURE — 36415 COLL VENOUS BLD VENIPUNCTURE: CPT

## 2022-05-11 PROCEDURE — 83883 ASSAY NEPHELOMETRY NOT SPEC: CPT

## 2022-05-11 PROCEDURE — 86334 IMMUNOFIX E-PHORESIS SERUM: CPT

## 2022-05-11 PROCEDURE — 85025 COMPLETE CBC W/AUTO DIFF WBC: CPT

## 2022-05-11 PROCEDURE — 84156 ASSAY OF PROTEIN URINE: CPT

## 2022-05-11 PROCEDURE — 82784 ASSAY IGA/IGD/IGG/IGM EACH: CPT

## 2022-05-12 ENCOUNTER — HOSPITAL ENCOUNTER (OUTPATIENT)
Dept: CT IMAGING | Age: 47
Discharge: HOME | End: 2022-05-12
Payer: COMMERCIAL

## 2022-05-12 ENCOUNTER — HOSPITAL ENCOUNTER (OUTPATIENT)
Age: 47
Discharge: HOME | End: 2022-05-12
Payer: COMMERCIAL

## 2022-05-12 DIAGNOSIS — Z13.220 LIPID SCREENING: ICD-10-CM

## 2022-05-12 DIAGNOSIS — R91.8 MULTIPLE PULMONARY NODULES: Primary | ICD-10-CM

## 2022-05-12 DIAGNOSIS — R91.1 INCIDENTAL PULMONARY NODULE, > 3MM AND < 8MM: ICD-10-CM

## 2022-05-12 DIAGNOSIS — R10.32 LEFT LOWER QUADRANT ABDOMINAL PAIN: ICD-10-CM

## 2022-05-12 LAB
ALBUMIN SERPL-MCNC: 3.9 G/DL (ref 3.5–5.1)
ALP BLD-CCNC: 58 U/L (ref 38–126)
ALT SERPL-CCNC: 27 U/L (ref 11–66)
ANION GAP SERPL CALCULATED.3IONS-SCNC: 7 MEQ/L (ref 8–16)
AST SERPL-CCNC: 24 U/L (ref 5–40)
BASOPHILS # BLD: 0.7 %
BASOPHILS ABSOLUTE: 0 THOU/MM3 (ref 0–0.1)
BILIRUB SERPL-MCNC: 0.2 MG/DL (ref 0.3–1.2)
BUN BLDV-MCNC: 15 MG/DL (ref 7–22)
CALCIUM SERPL-MCNC: 8.7 MG/DL (ref 8.5–10.5)
CHLORIDE BLD-SCNC: 104 MEQ/L (ref 98–111)
CHOLESTEROL, TOTAL: 171 MG/DL (ref 100–199)
CO2: 23 MEQ/L (ref 23–33)
CREAT SERPL-MCNC: 0.7 MG/DL (ref 0.4–1.2)
EOSINOPHIL # BLD: 2.2 %
EOSINOPHILS ABSOLUTE: 0.1 THOU/MM3 (ref 0–0.4)
ERYTHROCYTE [DISTWIDTH] IN BLOOD BY AUTOMATED COUNT: 14.6 % (ref 11.5–14.5)
ERYTHROCYTE [DISTWIDTH] IN BLOOD BY AUTOMATED COUNT: 47.9 FL (ref 35–45)
GFR SERPL CREATININE-BSD FRML MDRD: > 90 ML/MIN/1.73M2
GLUCOSE BLD-MCNC: 84 MG/DL (ref 70–108)
HCT VFR BLD CALC: 43.2 % (ref 42–52)
HDLC SERPL-MCNC: 48 MG/DL
HEMOGLOBIN: 13.2 GM/DL (ref 14–18)
IMMATURE GRANS (ABS): 0.01 THOU/MM3 (ref 0–0.07)
IMMATURE GRANULOCYTES: 0.2 %
LDL CHOLESTEROL CALCULATED: 104 MG/DL
LYMPHOCYTES # BLD: 33.3 %
LYMPHOCYTES ABSOLUTE: 1.8 THOU/MM3 (ref 1–4.8)
MCH RBC QN AUTO: 27.5 PG (ref 26–33)
MCHC RBC AUTO-ENTMCNC: 30.6 GM/DL (ref 32.2–35.5)
MCV RBC AUTO: 90 FL (ref 80–94)
MONOCYTES # BLD: 13.5 %
MONOCYTES ABSOLUTE: 0.7 THOU/MM3 (ref 0.4–1.3)
NUCLEATED RED BLOOD CELLS: 0 /100 WBC
PLATELET # BLD: 232 THOU/MM3 (ref 130–400)
PMV BLD AUTO: 11.2 FL (ref 9.4–12.4)
POTASSIUM SERPL-SCNC: 4.9 MEQ/L (ref 3.5–5.2)
RBC # BLD: 4.8 MILL/MM3 (ref 4.7–6.1)
SEG NEUTROPHILS: 50.1 %
SEGMENTED NEUTROPHILS ABSOLUTE COUNT: 2.7 THOU/MM3 (ref 1.8–7.7)
SODIUM BLD-SCNC: 134 MEQ/L (ref 135–145)
TOTAL PROTEIN: 8.3 G/DL (ref 6.1–8)
TRIGL SERPL-MCNC: 96 MG/DL (ref 0–199)
WBC # BLD: 5.4 THOU/MM3 (ref 4.8–10.8)

## 2022-05-12 PROCEDURE — 80053 COMPREHEN METABOLIC PANEL: CPT

## 2022-05-12 PROCEDURE — 80061 LIPID PANEL: CPT

## 2022-05-12 PROCEDURE — 6360000004 HC RX CONTRAST MEDICATION: Performed by: NURSE PRACTITIONER

## 2022-05-12 PROCEDURE — 36415 COLL VENOUS BLD VENIPUNCTURE: CPT

## 2022-05-12 PROCEDURE — 85025 COMPLETE CBC W/AUTO DIFF WBC: CPT

## 2022-05-12 PROCEDURE — 71260 CT THORAX DX C+: CPT

## 2022-05-12 RX ADMIN — IOPAMIDOL 80 ML: 755 INJECTION, SOLUTION INTRAVENOUS at 07:52

## 2022-05-13 ENCOUNTER — TELEPHONE (OUTPATIENT)
Dept: FAMILY MEDICINE CLINIC | Age: 47
End: 2022-05-13

## 2022-05-13 NOTE — TELEPHONE ENCOUNTER
----- Message from YI Garduno CNP sent at 5/12/2022 12:19 PM EDT -----  CT of the chest showed multiple bilateral subcentimeter pulmonary nodules. The largest is 6 mm in the left lower lobe. Recommendation is a follow-up CT of the chest in 3 to 6 months. Close monitoring with follow-up is acceptable because the nodules are less than 1 cm in size. He has a follow-up appointment with Dr. Anusha Kraft in October. Will order CT chest to be done before then, so results can be reviewed at that visit. He has an appointment with oncology next week, however, and if they feel further evaluation should be done before then, that is fine.

## 2022-05-14 LAB — PROTEIN ELECTROPHORESIS, URINE: NORMAL

## 2022-05-16 LAB — IMMUNOFIXATION WITH QUANT: NORMAL

## 2022-05-20 ENCOUNTER — OFFICE VISIT (OUTPATIENT)
Dept: ONCOLOGY | Age: 47
End: 2022-05-20
Payer: COMMERCIAL

## 2022-05-20 ENCOUNTER — HOSPITAL ENCOUNTER (OUTPATIENT)
Dept: INFUSION THERAPY | Age: 47
Discharge: HOME | End: 2022-05-20
Payer: COMMERCIAL

## 2022-05-20 VITALS
TEMPERATURE: 98.1 F | WEIGHT: 191 LBS | OXYGEN SATURATION: 97 % | HEART RATE: 78 BPM | HEIGHT: 63 IN | SYSTOLIC BLOOD PRESSURE: 108 MMHG | BODY MASS INDEX: 33.84 KG/M2 | RESPIRATION RATE: 18 BRPM | DIASTOLIC BLOOD PRESSURE: 76 MMHG

## 2022-05-20 VITALS
TEMPERATURE: 98.1 F | RESPIRATION RATE: 18 BRPM | SYSTOLIC BLOOD PRESSURE: 108 MMHG | HEART RATE: 78 BPM | DIASTOLIC BLOOD PRESSURE: 76 MMHG

## 2022-05-20 DIAGNOSIS — D47.2 SMOLDERING MULTIPLE MYELOMA: Primary | ICD-10-CM

## 2022-05-20 PROCEDURE — 99211 OFF/OP EST MAY X REQ PHY/QHP: CPT

## 2022-05-20 PROCEDURE — 99213 OFFICE O/P EST LOW 20 MIN: CPT | Performed by: INTERNAL MEDICINE

## 2022-05-20 ASSESSMENT — ENCOUNTER SYMPTOMS
CHEST TIGHTNESS: 0
RECTAL PAIN: 0
FACIAL SWELLING: 0
COUGH: 0
BLOOD IN STOOL: 0
BACK PAIN: 0
EYE DISCHARGE: 0
CONSTIPATION: 0
ABDOMINAL PAIN: 0
DIARRHEA: 0
TROUBLE SWALLOWING: 0
COLOR CHANGE: 0
ABDOMINAL DISTENTION: 0
VOMITING: 0
SHORTNESS OF BREATH: 0
SORE THROAT: 0
NAUSEA: 0
WHEEZING: 0

## 2022-05-20 NOTE — PROGRESS NOTES
[facility] PROFESSIONAL SERVS  [facility]  [address] [facility] [address]  [address] [facility] [address]  [address] [address]  Dept: [phone]  Dept Fax: [phone]: [phone]     Visit Date: 5/20/2022     Dolores Martell is a 55 y.o. male who presents today for:   Chief Complaint   Patient presents with    Follow-up     Smoldering multiple myeloma [facility])        HPI     Khalida Islas is a 55 y.o. 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 When he applied for life insurance he was denied because his total protein and globulin were elevated on screening lab work. Therefore he underwent further testing in June 2016. The immunofixation electrophoresis showed M-spike in the gamma region. The monoclonal protein peak accounted for 3.17 g/dL of the total 3.34 g/dL of protein in  the gamma region.  PAU pattern showed an IgG type kappa monoclonal protein. The patient had a bone marrow biopsy, cytogenetic analysis and bone survey. Bone marrow biopsy showed increased mature plasma cells (17%). No suspicious osseous lesions were identified on the bone survey. His labs met criteria for SMM ( an M-protein ?3 g/dL, 17% bone marrow plasma cells and no end-organ damage). The patient is currently followed by surveillance, no active treatment    Interim history on 05/20/2022:  Patient presents to the medical oncology clinic for 4 months follow-up visit. Today, he remains asymptomatic, he denies having any B symptoms, no bone pain, no peripheral neuropathy. He denies having any fevers, no recent infections. He continues to maintain stable weight with diet and exercise. He denies being placed on any new medications since last visit.   No visits to the emergency room, no admissions to the hospital.    Family History   Problem Relation Age of Onset    High Blood Pressure Mother     Other Mother         osteoporosis    Asthma Father     High Blood Pressure Father       Social History     Tobacco Use  Smoking status: Former Smoker     Packs/day: 0.50     Years: 10.00     Pack years: 5.00     Types: Cigarettes     Quit date: 2008     Years since quittin.4    Smokeless tobacco: Never Used   Substance Use Topics    Alcohol use: Yes     Comment: occasionally      Current Outpatient Medications   Medication Sig Dispense Refill    omeprazole (PRILOSEC) 20 MG delayed release capsule Take 1 capsule by mouth every morning (before breakfast) 30 capsule 5    vitamin D (CHOLECALCIFEROL) 50 MCG (2000 UT) TABS tablet Take 1 tablet by mouth daily 100 tablet 3     No current facility-administered medications for this visit. No Known Allergies   Health Maintenance   Topic Date Due    Colorectal Cancer Screen  Never done    COVID-19 Vaccine (4 - Booster for Moderna series) 2021    Pneumococcal 0-64 years Vaccine (3 - PPSV23 or PCV20) 2022    Depression Screen  2023    DTaP/Tdap/Td vaccine (2 - Td or Tdap) 2026    Lipids  2027    Flu vaccine  Completed    Hepatitis C screen  Completed    HIV screen  Completed    Hepatitis A vaccine  Aged Out    Hepatitis B vaccine  Aged Out    Hib vaccine  Aged Out    Meningococcal (ACWY) vaccine  Aged Out        Subjective:   Review of Systems   Constitutional: Negative for activity change, appetite change, fatigue and fever. HENT: Negative for congestion, dental problem, facial swelling, hearing loss, mouth sores, nosebleeds, sore throat, tinnitus and trouble swallowing. Eyes: Negative for discharge and visual disturbance. Respiratory: Negative for cough, chest tightness, shortness of breath and wheezing. Cardiovascular: Negative for chest pain, palpitations and leg swelling. Gastrointestinal: Negative for abdominal distention, abdominal pain, blood in stool, constipation, diarrhea, nausea, rectal pain and vomiting. Endocrine: Negative for cold intolerance, polydipsia and polyuria.    Genitourinary: Negative for decreased urine volume, difficulty urinating, dysuria, flank pain, hematuria and urgency. Musculoskeletal: Negative for arthralgias, back pain, gait problem, joint swelling, myalgias and neck stiffness. Skin: Negative for color change, rash and wound. Neurological: Negative for dizziness, tremors, seizures, speech difficulty, weakness, light-headedness, numbness and headaches. Hematological: Negative for adenopathy. Does not bruise/bleed easily. Psychiatric/Behavioral: Negative for confusion and sleep disturbance. The patient is not nervous/anxious. Objective:   Physical Exam  Vitals reviewed. Constitutional:       General: He is not in acute distress. Appearance: He is well-developed. HENT:      Head: Normocephalic. Mouth/Throat:      Pharynx: No oropharyngeal exudate. Eyes:      General: No scleral icterus. Right eye: No discharge. Left eye: No discharge. Pupils: Pupils are equal, round, and reactive to light. Neck:      Thyroid: No thyromegaly. Vascular: No JVD. Trachea: No tracheal deviation. Cardiovascular:      Rate and Rhythm: Normal rate. Heart sounds: Normal heart sounds. No murmur heard. No friction rub. No gallop. Pulmonary:      Effort: Pulmonary effort is normal. No respiratory distress. Breath sounds: Normal breath sounds. No stridor. No wheezing or rales. Chest:      Chest wall: No tenderness. Abdominal:      General: Bowel sounds are normal. There is no distension. Palpations: Abdomen is soft. There is no mass. Tenderness: There is no abdominal tenderness. There is no rebound. Musculoskeletal:         General: Normal range of motion. Cervical back: Normal range of motion and neck supple. Lymphadenopathy:      Cervical: No cervical adenopathy. Skin:     General: Skin is warm. Findings: No erythema or rash.    Neurological:      Mental Status: He is alert and oriented to person, place, and time.      Cranial Nerves: No cranial nerve deficit. Motor: No abnormal muscle tone. Deep Tendon Reflexes: Reflexes are normal and symmetric. Psychiatric:         Behavior: Behavior normal.         Thought Content: Thought content normal.         Judgment: Judgment normal.        /76 (Site: Right Upper Arm, Position: Sitting, Cuff Size: Medium Adult)   Pulse 78   Temp 98.1 °F (36.7 °C) (Oral)   Resp 18   Ht 5' 3\" (1.6 m)   Wt 191 lb (86.6 kg)   SpO2 97%   BMI 33.83 kg/m²      Imaging studies and labs:   Bone survey on 09/25/2016 showed:  1. No suspicious osseous lesions are identified on the bone survey. Lab Results   Component Value Date    WBC 5.4 05/12/2022    HGB 13.2 (L) 05/12/2022    HCT 43.2 05/12/2022    MCV 90.0 05/12/2022     05/12/2022       Chemistry        Component Value Date/Time     (L) 05/12/2022 0823    K 4.9 05/12/2022 0823     05/12/2022 0823    CO2 23 05/12/2022 0823    BUN 15 05/12/2022 0823    CREATININE 0.7 05/12/2022 0823        Component Value Date/Time    CALCIUM 8.7 05/12/2022 0823    ALKPHOS 58 05/12/2022 0823    AST 24 05/12/2022 0823    ALT 27 05/12/2022 0823    BILITOT 0.2 (L) 05/12/2022 0823        Lab Results   Component Value Date    ALT 27 05/12/2022    AST 24 05/12/2022    ALKPHOS 58 05/12/2022    BILITOT 0.2 (L) 05/12/2022       Immunofixation on June 22, 2016 showed:   The monoclonal protein peak accounts for 3.17 g/dL of the total 3.34 g/dL of protein in the gamma region. Immunofixation on December 5, 2016 showed: The monoclonal protein peak accounts for 2.91 g/dL of the total 3.00 g/dL of protein in the gamma region. Immunofixation on August 22, 2017 showed: The monoclonal protein peak accounts for 3.57 g/dL of the total 3.70 g/dL of protein in the gamma region. Immunofixation on February 1, 2018 showed:   The monoclonal protein peak  accounts for 3.33 g/dL of the total 3.49 g/dL of protein in the gamma region. Immunofixation on 08/02/2018 showed: The monoclonal protein peak accounts for 3.22 g/dL of the total 3.38 g/dL of protein in the gamma region. Immunofixation on 02/01/2019 showed: The monoclonal protein peak accounts for 3.45 g/dL of the total 3.62 g/dL of protein in the gamma region.    Immunofixation on 08/05/2019 showed: The monoclonal protein peak accounts for 3.17 g/dL of the total 3.34 g/dL of protein in the gamma region. Immunofixation on 02/06/2020 showed: The monoclonal protein peak accounts for 3.29 g/dL of the total 3.43 g/dL of protein in the gamma region. Immunofixation on 08/21/2020 showed: The monoclonal protein peak accounts for 3.36 g/dL of the total 3.48 g/dL of protein in   the gamma region. Immunofixation on 12/08/2020 showed: The monoclonal protein peak accounts for 3.47 g/dL of the total 3.58 g/dL of protein in the gamma region  Immunofixation on 04/09/2021 showed: The monoclonal protein peak accounts for 3.15 g/dL of the total 3.23 g/dL of protein in   the gamma region. Immunofixation on 09/15/2021 showed:   The monoclonal protein peak accounts for 3.44 g/dL of the total 3.52 g/dL of protein in   the gamma region. Immunofixation on 01/14/2022 showed: The monoclonal protein peak accounts for 3.21 g/dL of the total 3.32 g/dL of protein in   the gamma region  Immunofixation on 05/11/2022 showed:    The monoclonal protein peak accounts for 3.26 g/dL of the total 3.39 g/dL of protein in   the gamma region.     06/22/2016:  [place] Qnt Free Light Chains                   8.03 H   0.33-1.94     mg/dL   Lambda Qnt Free Light Chains                  0.77     0.57-2.63     mg/dL   Kappa/Lambda Free Light Chain Ratio          10.43 H   0.26-1.65    12/05/2016:  [place] Qnt Free Light Chains                   9.99 H   0.33-1.94     mg/dL   Lambda Qnt Free Light Chains                  0.85     0.57-2.63     mg/dL   Kappa/Lambda Free Light Chain Ratio          11.75 H   0. 26-1.65    02/01/2018:  [place] Qnt Free Light Chains                   9.66 H   0.33-1.94     mg/dL   Lambda Qnt Free Light Chains                  0.73     0.57-2.63     mg/dL   Kappa/Lambda Free Light Chain Ratio          13.23 H   0.26-1.65     08/02/2018:  [place] Qnt Free Light Chains                   8.90 H   0.33-1.94     mg/dL   Lambda Qnt Free Light Chains                  0.65     0.57-2.63     mg/dL   Kappa/Lambda Free Light Chain Ratio          13.69 H   0.26-1.65     02/06/2020  [place] Qnt Free Light Chains                  15.90    Lambda Qnt Free Light Chains                0.72        [place]/Lambda Free Light Chain Ratio    22.08 H       August 21, 2020:  [place] Qnt Free Light Chains                    64.30 H      Lambda Qnt Free Light Chains                  6.36       [place]/Lambda Free Light Chain Ratio       10.11 H        December 8, 2020:     [place] Qnt Free Light Chains    73.81 H      .   Lambda Qnt Free Light Chains    8.10        [place]/Lambda Free Light Chain Ratio      9.11 H      April 9, 2021:   [place] Qnt Free Light Chains                  78.17 H   3.30-19.40    mg/L   Lambda Qnt Free Light Chains                  5.13 L   5.71-26.30    mg/L   Kappa/Lambda Free Light Chain Ratio    15.24 H   0.26-1.65     September 15, 2021:  [place] Qnt Free Light Chains                  74.44 H   3.30-19.40    mg/L   Lambda Qnt Free Light Chains                  4.38 L   5.71-26.30    mg/L   Kappa/Lambda Free Light Chain Ratio          17.00 H   0.26-1.65    January 14, 2022:  [place] Qnt Free Light Chains                  63.81 H   3.30-19.40    mg/L   Lambda Qnt Free Light Chains                  5.21 L   5.71-26.30    mg/L   Kappa/Lambda Free Light Chain Ratio          12.25 H   0.26-1.65        Bone marrow biopsy on 09/23/2016 showed:      Normocellular bone marrow (60%) with orderly trilineage      hematopoiesis and increased mature plasma cells (17%).       Kappa restricted plasma cells were demonstrated

## 2022-09-22 ENCOUNTER — OFFICE VISIT (OUTPATIENT)
Dept: ONCOLOGY | Age: 47
End: 2022-09-22
Payer: COMMERCIAL

## 2022-09-22 ENCOUNTER — HOSPITAL ENCOUNTER (OUTPATIENT)
Dept: INFUSION THERAPY | Age: 47
Discharge: HOME | End: 2022-09-22
Payer: COMMERCIAL

## 2022-09-22 VITALS
BODY MASS INDEX: 34.02 KG/M2 | SYSTOLIC BLOOD PRESSURE: 122 MMHG | RESPIRATION RATE: 16 BRPM | TEMPERATURE: 98.6 F | HEART RATE: 69 BPM | OXYGEN SATURATION: 98 % | HEIGHT: 63 IN | WEIGHT: 192 LBS | DIASTOLIC BLOOD PRESSURE: 78 MMHG

## 2022-09-22 VITALS
DIASTOLIC BLOOD PRESSURE: 78 MMHG | TEMPERATURE: 98.6 F | RESPIRATION RATE: 16 BRPM | HEART RATE: 69 BPM | SYSTOLIC BLOOD PRESSURE: 122 MMHG

## 2022-09-22 DIAGNOSIS — D47.2 SMOLDERING MYELOMA: ICD-10-CM

## 2022-09-22 DIAGNOSIS — D47.2 SMOLDERING MYELOMA: Primary | ICD-10-CM

## 2022-09-22 LAB
ABSOLUTE IMMATURE GRANULOCYTE: 0.01 THOU/MM3 (ref 0–0.07)
ALBUMIN SERPL-MCNC: 4.2 G/DL (ref 3.5–5.1)
ALP BLD-CCNC: 67 U/L (ref 38–126)
ALT SERPL-CCNC: 32 U/L (ref 11–66)
AST SERPL-CCNC: 32 U/L (ref 5–40)
BASINOPHIL, AUTOMATED: 1 % (ref 0–3)
BASOPHILS ABSOLUTE: 0 THOU/MM3 (ref 0–0.1)
BILIRUB SERPL-MCNC: 0.3 MG/DL (ref 0.3–1.2)
BILIRUBIN DIRECT: < 0.2 MG/DL (ref 0–0.3)
BUN, WHOLE BLOOD: 13 MG/DL (ref 8–26)
CHLORIDE, WHOLE BLOOD: 105 MEQ/L (ref 98–109)
CREATININE, WHOLE BLOOD: 0.8 MG/DL (ref 0.5–1.2)
EOSINOPHILS ABSOLUTE: 0.3 THOU/MM3 (ref 0–0.4)
EOSINOPHILS RELATIVE PERCENT: 4 % (ref 0–4)
GFR, ESTIMATED: > 90 ML/MIN/1.73M2
GLUCOSE, WHOLE BLOOD: 90 MG/DL (ref 70–108)
HCT VFR BLD CALC: 46.2 % (ref 42–52)
HEMOGLOBIN: 14.4 GM/DL (ref 14–18)
IMMATURE GRANULOCYTES: 0 %
IONIZED CALCIUM, WHOLE BLOOD: 1.18 MMOL/L (ref 1.12–1.32)
LYMPHOCYTES # BLD: 41 % (ref 15–47)
LYMPHOCYTES ABSOLUTE: 2.6 THOU/MM3 (ref 1–4.8)
MCH RBC QN AUTO: 27.6 PG (ref 26–33)
MCHC RBC AUTO-ENTMCNC: 31.2 GM/DL (ref 32.2–35.5)
MCV RBC AUTO: 89 FL (ref 80–94)
MONOCYTES ABSOLUTE: 0.5 THOU/MM3 (ref 0.4–1.3)
MONOCYTES: 8 % (ref 0–12)
PDW BLD-RTO: 13.9 % (ref 11.5–14.5)
PLATELET # BLD: 254 THOU/MM3 (ref 130–400)
PMV BLD AUTO: 10.7 FL (ref 9.4–12.4)
POTASSIUM, WHOLE BLOOD: 4.1 MEQ/L (ref 3.5–4.9)
RBC # BLD: 5.21 MILL/MM3 (ref 4.7–6.1)
SEG NEUTROPHILS: 47 % (ref 43–75)
SEGMENTED NEUTROPHILS ABSOLUTE COUNT: 3 THOU/MM3 (ref 1.8–7.7)
SODIUM, WHOLE BLOOD: 140 MEQ/L (ref 138–146)
TOTAL CO2, WHOLE BLOOD: 26 MEQ/L (ref 23–33)
TOTAL PROTEIN: 10.1 G/DL (ref 6.1–8)
WBC # BLD: 6.3 THOU/MM3 (ref 4.8–10.8)

## 2022-09-22 PROCEDURE — 82784 ASSAY IGA/IGD/IGG/IGM EACH: CPT

## 2022-09-22 PROCEDURE — 84165 PROTEIN E-PHORESIS SERUM: CPT

## 2022-09-22 PROCEDURE — 99211 OFF/OP EST MAY X REQ PHY/QHP: CPT

## 2022-09-22 PROCEDURE — 85025 COMPLETE CBC W/AUTO DIFF WBC: CPT

## 2022-09-22 PROCEDURE — 84156 ASSAY OF PROTEIN URINE: CPT

## 2022-09-22 PROCEDURE — 84155 ASSAY OF PROTEIN SERUM: CPT

## 2022-09-22 PROCEDURE — 80076 HEPATIC FUNCTION PANEL: CPT

## 2022-09-22 PROCEDURE — 83883 ASSAY NEPHELOMETRY NOT SPEC: CPT

## 2022-09-22 PROCEDURE — 86335 IMMUNFIX E-PHORSIS/URINE/CSF: CPT

## 2022-09-22 PROCEDURE — 82232 ASSAY OF BETA-2 PROTEIN: CPT

## 2022-09-22 PROCEDURE — 36415 COLL VENOUS BLD VENIPUNCTURE: CPT

## 2022-09-22 PROCEDURE — 80047 BASIC METABLC PNL IONIZED CA: CPT

## 2022-09-22 PROCEDURE — 99215 OFFICE O/P EST HI 40 MIN: CPT | Performed by: INTERNAL MEDICINE

## 2022-09-22 NOTE — PROGRESS NOTES
[address] [facility]  [address] [place] [address]  Dept: [phone]  Loc: [phone]   Hematology/Oncology Progress Note (Clinic)        Amada Soler  1975     No ref. provider found   Kelle Sahu MD     Diagnosis:   -Smoldering myeloma diagnosed 2016      Treatment:   -Surveillance      Followable Disease:   -2016 monoclonal spike in the gamma region 3.17 g/dL, IgG kappa monoclonal protein, bone marrow biopsy-17% plasma cells, no suspicious bone lesions on bone survey. Comorbidities:  None      Subjective:   Patient is here for routine follow-up with no new complaints. Denies any bone or back pain. No fatigue. No increased frequency or severity of infections. ROS:  Review of Systems 14 point negative except as above.     PMH:   Past Medical History:   Diagnosis Date    Smoldering multiple myeloma ([facility] [address] 10/01/2016    Multiple Myeloma         Social HX:   Social History     Socioeconomic History    Marital status:      Spouse name: Electa Boxer    Number of children: 2    Years of education: 16    Highest education level: Not on file   Occupational History    Occupation: Dental Tech     Comment: [employer]   Tobacco Use    Smoking status: Former     Packs/day: 0.50     Years: 10.00     Pack years: 5.00     Types: Cigarettes     Quit date: 2008     Years since quittin.7    Smokeless tobacco: Never   Substance and Sexual Activity    Alcohol use: Yes     Comment: occasionally    Drug use: No    Sexual activity: Yes     Partners: Female   Other Topics Concern    Not on file   Social History Narrative    Not on file     Social Determinants of Health     Financial Resource Strain: Low Risk     Difficulty of Paying Living Expenses: Not hard at all   Food Insecurity: No Food Insecurity    Worried About [address] in the Last Year: Never true    [address] in the Last Year: Never true   Transportation Needs: Not on file   Physical Activity: Not on file   Stress: Not on file   Social Connections: Not on file   Intimate Partner Violence: Not on file   Housing Stability: Not on file        SpouseGail Relic    Phone: [phone]  Radha ARCHULETA/ [facility]     Employment:  Dental Lab owner    Immunizations:  Immunization History   Administered Date(s) Administered    COVID-19, MODERNA BLUE border, Primary or Immunocompromised, (age 12y+), IM, 100 mcg/0.5mL 02/19/2021, 03/19/2021, 09/24/2021    Hepatitis A 11/17/2016, 05/19/2017    Hepatitis B (Recombivax HB) 11/17/2016, 01/20/2017, 05/19/2017    Influenza Virus Vaccine 11/01/2017, 10/18/2020, 09/24/2021    Influenza, AFLURIA (age 1 yrs+), FLUZONE, (age 10 mo+), MDV, 0.5mL 09/20/2018, 01/03/2020    Influenza, FLUARIX, FLULAVAL, FLUZONE (age 10 mo+) AND AFLURIA, (age 1 y+), PF, 0.5mL 09/14/2016, 10/18/2020, 09/24/2021    Pneumococcal Conjugate 13-valent ([id number]) 11/17/2016    Pneumococcal Polysaccharide ([id number]) 11/17/2017    Tdap (Boostrix, Adacel) 09/14/2016        Health Screenings:    C-Scope: 2022.   Prostate:         Health Maintenance Due   Topic Date Due    COVID-19 Vaccine (4 - Booster for Moderna series) 12/17/2021    Flu vaccine (1) 09/01/2022        Interests:   biking    Fam HX:   Family History   Problem Relation Age of Onset    High Blood Pressure Mother     Other Mother         osteoporosis    Asthma Father     High Blood Pressure Father         Hospitalizations:   None recent    Allergies:  No Known Allergies     Adult Illness:  Patient Active Problem List   Diagnosis    Smoldering multiple myeloma ([facility] [address]        Surgery:  Past Surgical History:   Procedure Laterality Date    BONE MARROW BIOPSY  09/23/2016    [facility]        Medications:  Current Outpatient Medications   Medication Sig Dispense Refill    vitamin D (CHOLECALCIFEROL) 50 MCG (2000 UT) TABS tablet Take 1 tablet by mouth daily 100 tablet 3     No current facility-administered medications for this visit. EXAM:   height is 5' 3\" (1.6 m) and weight is 192 lb (87.1 kg). His oral temperature is 98.6 °F (37 °C). His blood pressure is 122/78 and his pulse is 69. His respiration is 16 and oxygen saturation is 98%. Estimated body surface area is 1.97 meters squared as calculated from the following:    Height as of this encounter: 5' 3\" (1.6 m). Weight as of this encounter: 192 lb (87.1 kg). ECO    General: Non-ill appearing. Appears a stated age. Pleasant comfortable in no distress. HEENT: NC/AT,nonicteric,   Neck: normal thyroid, no masses  Nodes: No adenopathy  Lungs/chest: clear, no rales,rhonchi or wheezing, lung bases clear  CV: rrr, no rubs ,gallops or murmurs  Breasts: Not examined  Abd/Rectal: soft, non-tender,bowel sounds normal , no HSM,no masses  Back: normal curvature, No midline tenderness. : Not Examined  Extremities: no cyanosis,clubbing or edema. Skin: unremarkable  Neuro: A and O x 4, CN exam nonfocal, Motor- no deficits, Sensory- no deficits, gait-nl, speech- fluent, no ataxia.   Devices: none    DATA:  LAB:     CBC with Differential:      Lab Results   Component Value Date/Time    WBC 5.4 2022 08:23 AM    RBC 4.80 2022 08:23 AM    HGB 13.2 2022 08:23 AM    HCT 43.2 2022 08:23 AM     2022 08:23 AM    MCV 90.0 2022 08:23 AM    MCH 27.5 2022 08:23 AM    MCHC 30.6 2022 08:23 AM    RDW 14.3 2022 03:59 PM    NRBC 0 2022 08:23 AM    SEGSPCT 50.1 2022 08:23 AM    MONOPCT 13.5 2022 08:23 AM    MONOSABS 0.7 2022 08:23 AM    LYMPHSABS 1.8 2022 08:23 AM    EOSABS 0.1 2022 08:23 AM    BASOSABS 0.0 2022 08:23 AM      Lab Results   Component Value Date/Time    SEGSABS 2.7 2022 08:23 AM       CMP:    Lab Results   Component Value Date/Time     2022 08:23 AM    K 4.9 2022 08:23 AM     05/12/2022 08:23 AM    CO2 23 05/12/2022 08:23 AM    BUN 15 05/12/2022 08:23 AM    CREATININE 0.7 05/12/2022 08:23 AM    LABGLOM >90 05/12/2022 08:23 AM    GLUCOSE 84 05/12/2022 08:23 AM    PROT 8.3 05/12/2022 08:23 AM    LABALBU 3.9 05/12/2022 08:23 AM    CALCIUM 8.7 05/12/2022 08:23 AM    BILITOT 0.2 05/12/2022 08:23 AM    ALKPHOS 58 05/12/2022 08:23 AM    AST 24 05/12/2022 08:23 AM    ALT 27 05/12/2022 08:23 AM       BMP:    Lab Results   Component Value Date/Time     05/12/2022 08:23 AM    K 4.9 05/12/2022 08:23 AM     05/12/2022 08:23 AM    CO2 23 05/12/2022 08:23 AM    BUN 15 05/12/2022 08:23 AM    LABALBU 3.9 05/12/2022 08:23 AM    CREATININE 0.7 05/12/2022 08:23 AM    CALCIUM 8.7 05/12/2022 08:23 AM    LABGLOM >90 05/12/2022 08:23 AM    GLUCOSE 84 05/12/2022 08:23 AM       Magnesium:  No results found for: MG  PT/INR:    Lab Results   Component Value Date/Time    INR 1.07 04/09/2021 11:46 AM     TSH:    Lab Results   Component Value Date/Time    TSH 1.950 02/15/2021 06:33 AM     9/22/2022  Initial visit with me. Complete set of myeloma parameters drawn. IMAGING:  -Bone scan 9/15/2020-no metastatic bony lesions. -CT abdomen 4/22/22-6 mm nodule left lower lobe. Uncomplicated colonic diverticulosis. PROCEDURES:  Bone marrow biopsy 2016    PATHOLOGY:   Bone marrow biopsy 2016 showed 17% plasma cells. FISH resulted-normal.    GENETICS:  Normal marrow. MOLECULAR:  FISH panel normal for myeloma in 2016. ASSESSMENT/PLAN:    1: Diagnosis: Very pleasant 77-year-old gentleman diagnosed in June 2016 with smoldering myeloma. Currently no evidence of endorgan damage i.e. no myeloma. First appointment with me today we will reevaluate complete myeloma parameters and repeat skeletal survey. He is asymptomatic.     2) Prognosis / Disease Status: Observe closely for progression to myeloma    3) Work-up:    Labs: 9/22/2022 CBC, CMP, SPEP, UPEP, quantitative globulins, beta-2 microglobulin and serum light chains. Imaging: Skeletal bone survey   Procedures:   Consults:   Other:    4) Symptom Management: He is asymptomatic      5) Supportive care provided. Level of care is appropriate. Teaching done today. Reviewed the criteria for myeloma versus multiple myeloma. Reviewed endorgan damage criteria. 6) Treatment goal:      Treatment plan:      Continue surveillance. 7) Medications reviewed. Prescriptions today:            No orders of the defined types were placed in this encounter. OARRS:  Controlled Substance Monitoring:    Acute and Chronic Pain Monitoring:   No flowsheet data found. 8) Research Options:       None      9) Other:        None    10) Follow Up:  Return in about 3 months (around 12/21/2022).   Patient to get myeloma parameters drawn on December 13        Maria Luisa Suh MD

## 2022-09-22 NOTE — PATIENT INSTRUCTIONS
Myeloma labs to be drawn today. Schedule myeloma bone and skeletal survey  Routine follow-up with me in 3 months on Wednesday, December 21  Patient will come in for labs to be drawn early the week of December 12.

## 2022-09-23 LAB
IGA: 83 MG/DL (ref 70–400)
IGG: 4506 MG/DL (ref 700–1600)
IGM: 85 MG/DL (ref 40–230)

## 2022-09-25 LAB — KAPPA/LAMBDA FREE LIGHT CHAINS: NORMAL

## 2022-09-26 LAB
BETA-2 MICROGLOBULIN: 1.6 MG/L (ref 0.6–2.4)
PROTEIN ELECTROPHORESIS, URINE: NORMAL

## 2022-09-27 LAB
IGA: 84 MG/DL (ref 68–408)
MISC. #1 REFERENCE GROUP TEST: ABNORMAL

## 2022-10-27 ENCOUNTER — HOSPITAL ENCOUNTER (OUTPATIENT)
Dept: GENERAL RADIOLOGY | Age: 47
Discharge: HOME | End: 2022-10-27
Payer: COMMERCIAL

## 2022-10-27 ENCOUNTER — HOSPITAL ENCOUNTER (OUTPATIENT)
Age: 47
Discharge: HOME | End: 2022-10-27
Payer: COMMERCIAL

## 2022-10-27 DIAGNOSIS — D47.2 SMOLDERING MYELOMA: ICD-10-CM

## 2022-10-27 PROCEDURE — 77075 RADEX OSSEOUS SURVEY COMPL: CPT

## 2022-11-07 ENCOUNTER — OFFICE VISIT (OUTPATIENT)
Dept: FAMILY MEDICINE CLINIC | Age: 47
End: 2022-11-07
Payer: COMMERCIAL

## 2022-11-07 VITALS
BODY MASS INDEX: 34.16 KG/M2 | RESPIRATION RATE: 16 BRPM | TEMPERATURE: 98.1 F | WEIGHT: 192.8 LBS | HEART RATE: 90 BPM | SYSTOLIC BLOOD PRESSURE: 115 MMHG | HEIGHT: 63 IN | DIASTOLIC BLOOD PRESSURE: 77 MMHG

## 2022-11-07 DIAGNOSIS — Z13.220 LIPID SCREENING: ICD-10-CM

## 2022-11-07 DIAGNOSIS — Z13.1 DIABETES MELLITUS SCREENING: ICD-10-CM

## 2022-11-07 DIAGNOSIS — Z00.00 WELLNESS EXAMINATION: Primary | ICD-10-CM

## 2022-11-07 PROCEDURE — 99396 PREV VISIT EST AGE 40-64: CPT | Performed by: FAMILY MEDICINE

## 2022-11-07 RX ORDER — CLOBETASOL PROPIONATE 0.5 MG/G
OINTMENT TOPICAL
Qty: 60 G | Refills: 0 | Status: SHIPPED | OUTPATIENT
Start: 2022-11-07

## 2022-11-07 NOTE — PATIENT INSTRUCTIONS
Learning About Foods With Healthy Fats  What foods contain healthy fats? The foods you eat contain nutrients, such as vitamins and minerals. Fat is a nutrient. Your body needs the right amount to stay healthy and work as it should. You can use the list below to help you make choices about which foodsto eat. Here are some foods that contain healthy fats. Unsaturated fats and oils  Canola oil  Corn oil  Flaxseed oil  Olive oil  Peanut oil  Safflower oil  Sunflower oil  [address] trout  Mackerel  Oysters  Dennehotso  Sardines  Nuts and seeds  Almonds  Avni seeds  Flaxseeds (ground)  Hazelnuts  Nut butters (such as peanut and almond)  Pumpkin seeds  Sunflower seeds  Walnuts  Fruits  Avocados  Olives  Work with your doctor to find out how much of this nutrient you need. Dependingon your health, you may need more or less of it in your diet. Learning About the [facility] Diet  What is the Mediterranean diet? The Mediterranean diet is a style of eating rather than a diet plan. It features foods eaten in [place], [place], [place] and [place], and other countries along the [facility]. It emphasizes eating foods like fish, fruits, vegetables, beans, high-fiber breads and whole grains, nuts, and oliveoil. This style of eating includes limited red meat, cheese, and sweets. Why choose the Mediterranean diet? A Mediterranean-style diet may improve heart health. It contains more fat than other heart-healthy diets. But the fats are mainly from nuts, unsaturated oils (such as fish oils and olive oil), and certain nut or seed oils (such as canola, soybean, or flaxseed oil). These fats may help protect the heart andblood vessels. How can you get started on the Mediterranean diet? Here are some things you can do to switch to a more Mediterranean way of eating.   What to eat  Eat a variety of fruits and vegetables each day, such as grapes, blueberries, tomatoes, broccoli, peppers, figs, olives, spinach, eggplant, beans, lentils, and chickpeas. Eat a variety of whole-grain foods each day, such as oats, brown rice, and whole wheat bread, pasta, and couscous. Eat fish at least 2 times a week. Try tuna, salmon, mackerel, lake trout, herring, or sardines. Eat moderate amounts of low-fat dairy products, such as milk, cheese, or yogurt. Eat moderate amounts of poultry and eggs. Choose healthy (unsaturated) fats, such as nuts, olive oil, and certain nut or seed oils like canola, soybean, and flaxseed. Limit unhealthy (saturated) fats, such as butter, palm oil, and coconut oil. And limit fats found in animal products, such as meat and dairy products made with whole milk. Try to eat red meat only a few times a month in very small amounts. Limit sweets and desserts to only a few times a week. This includes sugar-sweetened drinks like soda. The Mediterranean diet may also include red wine with your meal--1 glass eachday for women and up to 2 glasses a day for men. Tips for eating at home  Use herbs, spices, garlic, lemon zest, and citrus juice instead of salt to add flavor to foods. Add avocado slices to your sandwich instead of wkong. Have fish for lunch or dinner instead of red meat. Brush the fish with olive oil, and broil or grill it. Sprinkle your salad with seeds or nuts instead of cheese. Cook with olive or canola oil instead of butter or oils that are high in saturated fat. Switch from 2% milk or whole milk to 1% or fat-free milk. Dip raw vegetables in a vinaigrette dressing or hummus instead of dips made from mayonnaise or sour cream.  Have a piece of fruit for dessert instead of a piece of cake. Try baked apples, or have some dried fruit. Tips for eating out  Try broiled, grilled, baked, or poached fish instead of having it fried or breaded. Ask your  to have your meals prepared with olive oil instead of butter. Order dishes made with marinara sauce or sauces made from olive oil. Avoid sauces made from cream or mayonnaise. Choose whole-grain breads, whole wheat pasta and pizza crust, brown rice, beans, and lentils. Cut back on butter or margarine on bread. Instead, you can dip your bread in a small amount of olive oil. Ask for a side salad or grilled vegetables instead of french fries or chips. DASH Diet: Care Instructions  Your Care Instructions     The DASH diet is an eating plan that can help lower your blood pressure. DASH stands for Dietary Approaches to Stop Hypertension. Hypertension is high bloodpressure. The DASH diet focuses on eating foods that are high in calcium, potassium, and magnesium. These nutrients can lower blood pressure. The foods that are highest in these nutrients are fruits, vegetables, low-fat dairy products, nuts, seeds, and legumes. But taking calcium, potassium, and magnesium supplements instead of eating foods that are high in those nutrients does not have the same effect. The DASH diet also includes whole grains, fish, and poultry. The DASH diet is one of several lifestyle changes your doctor may recommend to lower your high blood pressure. Your doctor may also want you to decrease the amount of sodium in your diet. Lowering sodium while following the DASH dietcan lower blood pressure even further than just the DASH diet alone. Follow-up care is a key part of your treatment and safety. Be sure to make and go to all appointments, and call your doctor if you are having problems. It's also a good idea to know your test results and keep alist of the medicines you take. How can you care for yourself at home? Following the DASH diet  Eat 4 to 5 servings of fruit each day. A serving is 1 medium-sized piece of fruit, ½ cup chopped or canned fruit, 1/4 cup dried fruit, or 4 ounces (½ cup) of fruit juice. Choose fruit more often than fruit juice. Eat 4 to 5 servings of vegetables each day.  A serving is 1 cup of lettuce or raw leafy vegetables, ½ cup of chopped or cooked vegetables, or 4 ounces (½ cup) of vegetable juice. Choose vegetables more often than vegetable juice. Get 2 to 3 servings of low-fat and fat-free dairy each day. A serving is 8 ounces of milk, 1 cup of yogurt, or 1 ½ ounces of cheese. Eat 6 to 8 servings of grains each day. A serving is 1 slice of bread, 1 ounce of dry cereal, or ½ cup of cooked rice, pasta, or cooked cereal. Try to choose whole-grain products as much as possible. Limit lean meat, poultry, and fish to 2 servings each day. A serving is 3 ounces, about the size of a deck of cards. Eat 4 to 5 servings of nuts, seeds, and legumes (cooked dried beans, lentils, and split peas) each week. A serving is 1/3 cup of nuts, 2 tablespoons of seeds, or ½ cup of cooked beans or peas. Limit fats and oils to 2 to 3 servings each day. A serving is 1 teaspoon of vegetable oil or 2 tablespoons of salad dressing. Limit sweets and added sugars to 5 servings or less a week. A serving is 1 tablespoon jelly or jam, ½ cup sorbet, or 1 cup of lemonade. Eat less than 2,300 milligrams (mg) of sodium a day. If you limit your sodium to 1,500 mg a day, you can lower your blood pressure even more. Be aware that all of these are the suggested number of servings for people who eat 1,800 to 2,000 calories a day. Your recommended number of servings may be different if you need more or fewer calories. Tips for success  Start small. Do not try to make dramatic changes to your diet all at once. You might feel that you are missing out on your favorite foods and then be more likely to not follow the plan. Make small changes, and stick with them. Once those changes become habit, add a few more changes. Try some of the following:  Make it a goal to eat a fruit or vegetable at every meal and at snacks. This will make it easy to get the recommended amount of fruits and vegetables each day. Try yogurt topped with fruit and nuts for a snack or healthy dessert.   Add lettuce, tomato, cucumber, and onion to sandwiches. Combine a ready-made pizza crust with low-fat mozzarella cheese and lots of vegetable toppings. Try using tomatoes, squash, spinach, broccoli, carrots, cauliflower, and onions. Have a variety of cut-up vegetables with a low-fat dip as an appetizer instead of chips and dip. Sprinkle sunflower seeds or chopped almonds over salads. Or try adding chopped walnuts or almonds to cooked vegetables. Try some vegetarian meals using beans and peas. Add garbanzo or kidney beans to salads. Make burritos and tacos with mashed spencer beans or black beans. Walking for Exercise: Care Instructions  Your Care Instructions     Walking is one of the easiest ways to get the exercise you need for good health. A brisk, 30-minute walk each day can help you feel better and have more energy. It can help you lower your risk of disease. Walking can help you keepyour bones strong and your heart healthy. Check with your doctor before you start a walking plan if you have heart problems, other health issues, or you have not been active in a long time. Follow your doctor's instructions for safe levels of exercise. Follow-up care is a key part of your treatment and safety. Be sure to make and go to all appointments, and call your doctor if you are having problems. It's also a good idea to know your test results and keep alist of the medicines you take. How can you care for yourself at home? Getting started  Start slowly and set a short-term goal. For example, walk for 5 or 10 minutes every day. Bit by bit, increase the amount you walk every day. Try for at least 30 minutes on most days of the week. You also may want to swim, bike, or do other activities. If finding enough time is a problem, it's fine to be active in shorter periods of time throughout your day.   To get the heart-healthy benefits of walking, you need to walk briskly enough to increase your heart rate and breathing, but not so fast that you can't talk comfortably. Wear comfortable shoes that fit well and provide good support for your feet and ankles. Staying with your plan  After you've made walking a habit, set a longer-term goal. You may want to set a goal of walking briskly for longer or walking farther. Experts say to do 2½ hours (150 minutes) of moderate activity a week. A faster heartbeat is what defines moderate-level activity. To stay motivated, walk with friends, coworkers, or pets. Use a phone silvia or pedometer to track your steps each day. Set a goal to increase your steps. When you reach that goal, set a higher goal.  If the weather keeps you from walking outside, go for walks at the mall with a friend. Local schools and churches may have indoor gyms where you can walk. Fitting a walk into your workday  Park several blocks away from work, or get off the bus a few stops early. Use the stairs instead of the elevator, at least for a few floors. Suggest holding meetings with colleagues during a walk inside or outside the building. Use the restroom that is the farthest from your desk or workstation. Use your morning and afternoon breaks to take quick 15 minutes walks. Staying safe  Know your surroundings. Walk in a well-lighted, safe place. If it's dark, walk with a partner. Wear light-colored clothing. If you can, buy a vest or jacket that reflects light. Carry a cell phone for emergencies. Drink plenty of water. Take a water bottle with you when you walk. This is very important if it is hot out. Be careful not to slip on wet or icy ground. You can buy \"grippers\" for your shoes to help keep you from slipping. Pay attention to your walking surface. Use sidewalks and paths. If you have health issues such as asthma, COPD, or heart problems, or if you haven't been active for a long time, check with your doctor before you start a new activity.

## 2022-11-07 NOTE — PROGRESS NOTES
[address]  [address] [place], [address]  Ph:   [phone]  Fax: [phone]    Provider:  Dr. Taz Salgado NOTES     HPI:  Tammy Wetzel is a 55y.o. year old male, who comes today for an annual wellness visit. Smoldering myeloma diagnosed 2016. Sees Dr. Art Aragon. Last visit Sep 22, 2022. Work-up:   Labs: 2022 CBC, CMP, SPEP, UPEP, quantitative globulins, beta-2 microglobulin and serum light chains.   Imaging: Skeletal bone survey  10/28/22 XR BONE SURVEY COMPLETE: Negative whole body skeletal survey, no definite lytic lesions noted      Past Medical History:   Diagnosis Date    Smoldering multiple myeloma 10/01/2016    Multiple Myeloma        Past Surgical History:   Procedure Laterality Date    BONE MARROW BIOPSY  2016    COLONOSCOPY      [facility]       Family History   Problem Relation Age of Onset    High Blood Pressure Mother     Other Mother         osteoporosis    Asthma Father     High Blood Pressure Father        Social History     Socioeconomic History    Marital status:      Spouse name: An Mario    Number of children: 2    Years of education: 16    Highest education level: Not on file   Occupational History    Occupation: Dental Tech     Comment: [employer]   Tobacco Use    Smoking status: Former     Packs/day: 0.50     Years: 10.00     Pack years: 5.00     Types: Cigarettes     Quit date: 2008     Years since quittin.8    Smokeless tobacco: Never   Substance and Sexual Activity    Alcohol use: Yes     Comment: occasionally    Drug use: No    Sexual activity: Yes     Partners: Female   Other Topics Concern    Not on file   Social History Narrative    Not on file     Social Determinants of Health     Financial Resource Strain: Low Risk     Difficulty of Paying Living Expenses: Not hard at all   Food Insecurity: No Food Insecurity    Worried About [address] in the Last Year: Never true    [address] in the Last Year: Never true   Transportation Needs: Not on file   Physical Activity: Not on file   Stress: Not on file   Social Connections: Not on file   Intimate Partner Violence: Not on file   Housing Stability: Not on file       No Known Allergies    Reva Mendoza has a current medication list which includes the following prescription(s): clobetasol and vitamin d.    Objective:  Blood pressure 115/77, pulse 90, temperature 98.1 °F (36.7 °C), temperature source Temporal, resp. rate 16, height 5' 3\" (1.6 m), weight 192 lb 12.8 oz (87.5 kg). Physical Examination:   General Appearance - alert, well appearing, and in no distress and oriented to person, place, and time  Eyes - pupils equal and reactive, extraocular eye movements intact, sclera anicteric  Ears - bilateral TM's and external ear canals normal, hearing grossly normal bilaterally  Nose - normal and patent, no erythema, discharge or polyps  Mouth - mucous membranes moist, pharynx normal without lesions  Neck - supple, no significant adenopathy  Lymphatics - no palpable lymphadenopathy  Chest - clear to auscultation, no wheezes, rales or rhonchi, symmetric air entry  Heart - normal rate, regular rhythm, normal S1, S2, no murmurs, rubs, clicks or gallops  Abdomen - soft, nontender, nondistended, no masses or organomegaly  Neurological -  oriented, normal speech, no focal findings or movement disorder noted  Extremities - peripheral pulses normal, no pedal edema, no clubbing or cyanosis  Skin - normal coloration and turgor, no rashes, no suspicious skin lesions noted    Assessment:   Diagnosis Orders   1. Wellness examination        2. Diabetes mellitus screening  Hemoglobin A1C      3. BMI 34.0-34.9,adult  Vitamin D 25 Hydroxy      4. Lipid screening  Lipid Panel          Plan:    He already got the Influenza vaccine    Return in about 6 months (around 5/7/2023). .    Counseling was provided today regarding the following topics:  Healthy eating habits and snacks, talking or texting while driving, vitamin/Mineral supplementation, vitamin D and calcium intake, regular exercise, and breast self exam. Written information has been provided.          Eris Gee MD

## 2022-12-12 ENCOUNTER — HOSPITAL ENCOUNTER (OUTPATIENT)
Age: 47
Discharge: HOME | End: 2022-12-12
Payer: COMMERCIAL

## 2022-12-12 DIAGNOSIS — Z13.220 LIPID SCREENING: ICD-10-CM

## 2022-12-12 DIAGNOSIS — Z13.1 DIABETES MELLITUS SCREENING: ICD-10-CM

## 2022-12-12 LAB
AVERAGE GLUCOSE: 120 MG/DL (ref 70–126)
CHOLESTEROL, TOTAL: 201 MG/DL (ref 100–199)
HBA1C MFR BLD: 6 % (ref 4.4–6.4)
HDLC SERPL-MCNC: 41 MG/DL
LDL CHOLESTEROL CALCULATED: 106 MG/DL
TRIGL SERPL-MCNC: 270 MG/DL (ref 0–199)
VITAMIN D 25-HYDROXY: 29 NG/ML (ref 30–100)

## 2022-12-12 PROCEDURE — 83036 HEMOGLOBIN GLYCOSYLATED A1C: CPT

## 2022-12-12 PROCEDURE — 36415 COLL VENOUS BLD VENIPUNCTURE: CPT

## 2022-12-12 PROCEDURE — 80061 LIPID PANEL: CPT

## 2022-12-12 PROCEDURE — 82306 VITAMIN D 25 HYDROXY: CPT

## 2023-01-11 ENCOUNTER — HOSPITAL ENCOUNTER (OUTPATIENT)
Dept: INFUSION THERAPY | Age: 48
Discharge: HOME | End: 2023-01-11
Payer: COMMERCIAL

## 2023-01-11 ENCOUNTER — OFFICE VISIT (OUTPATIENT)
Dept: ONCOLOGY | Age: 48
End: 2023-01-11
Payer: COMMERCIAL

## 2023-01-11 VITALS
SYSTOLIC BLOOD PRESSURE: 114 MMHG | DIASTOLIC BLOOD PRESSURE: 70 MMHG | RESPIRATION RATE: 16 BRPM | TEMPERATURE: 98.4 F | OXYGEN SATURATION: 97 % | HEART RATE: 79 BPM

## 2023-01-11 VITALS
HEART RATE: 79 BPM | HEIGHT: 63 IN | DIASTOLIC BLOOD PRESSURE: 70 MMHG | OXYGEN SATURATION: 97 % | WEIGHT: 195 LBS | SYSTOLIC BLOOD PRESSURE: 114 MMHG | TEMPERATURE: 98.4 F | RESPIRATION RATE: 16 BRPM | BODY MASS INDEX: 34.55 KG/M2

## 2023-01-11 DIAGNOSIS — D47.2 SMOLDERING MYELOMA: Primary | ICD-10-CM

## 2023-01-11 DIAGNOSIS — R91.8 LUNG NODULES: ICD-10-CM

## 2023-01-11 DIAGNOSIS — D47.2 SMOLDERING MYELOMA: ICD-10-CM

## 2023-01-11 LAB
ABSOLUTE IMMATURE GRANULOCYTE: 0.02 THOU/MM3 (ref 0–0.07)
ALBUMIN SERPL-MCNC: 4 G/DL (ref 3.5–5.1)
ALP BLD-CCNC: 64 U/L (ref 38–126)
ALT SERPL-CCNC: 38 U/L (ref 11–66)
ANION GAP SERPL CALCULATED.3IONS-SCNC: 9 MEQ/L (ref 8–16)
AST SERPL-CCNC: 27 U/L (ref 5–40)
BASINOPHIL, AUTOMATED: 1 % (ref 0–3)
BASOPHILS ABSOLUTE: 0.1 THOU/MM3 (ref 0–0.1)
BILIRUB SERPL-MCNC: 0.2 MG/DL (ref 0.3–1.2)
BUN BLDV-MCNC: 13 MG/DL (ref 7–22)
CALCIUM IONIZED: 1.26 MMOL/L (ref 1.12–1.32)
CALCIUM SERPL-MCNC: 8.9 MG/DL (ref 8.5–10.5)
CHLORIDE BLD-SCNC: 100 MEQ/L (ref 98–111)
CO2: 24 MEQ/L (ref 23–33)
CREAT SERPL-MCNC: 0.8 MG/DL (ref 0.4–1.2)
EOSINOPHILS ABSOLUTE: 0.4 THOU/MM3 (ref 0–0.4)
EOSINOPHILS RELATIVE PERCENT: 5 % (ref 0–4)
GFR SERPL CREATININE-BSD FRML MDRD: > 60 ML/MIN/1.73M2
GLUCOSE BLD-MCNC: 78 MG/DL (ref 70–108)
HCT VFR BLD CALC: 45.8 % (ref 42–52)
HEMOGLOBIN: 14.3 GM/DL (ref 14–18)
IMMATURE GRANULOCYTES: 0 %
LD: 159 U/L (ref 100–190)
LYMPHOCYTES # BLD: 41 % (ref 15–47)
LYMPHOCYTES ABSOLUTE: 2.9 THOU/MM3 (ref 1–4.8)
MCH RBC QN AUTO: 27.7 PG (ref 26–33)
MCHC RBC AUTO-ENTMCNC: 31.2 GM/DL (ref 32.2–35.5)
MCV RBC AUTO: 89 FL (ref 80–94)
MONOCYTES ABSOLUTE: 0.8 THOU/MM3 (ref 0.4–1.3)
MONOCYTES: 11 % (ref 0–12)
PDW BLD-RTO: 13.8 % (ref 11.5–14.5)
PLATELET # BLD: 200 THOU/MM3 (ref 130–400)
PMV BLD AUTO: 11.2 FL (ref 9.4–12.4)
POTASSIUM SERPL-SCNC: 3.9 MEQ/L (ref 3.5–5.2)
RBC # BLD: 5.16 MILL/MM3 (ref 4.7–6.1)
SEG NEUTROPHILS: 42 % (ref 43–75)
SEGMENTED NEUTROPHILS ABSOLUTE COUNT: 3 THOU/MM3 (ref 1.8–7.7)
SODIUM BLD-SCNC: 133 MEQ/L (ref 135–145)
TOTAL PROTEIN: 9.9 G/DL (ref 6.1–8)
WBC # BLD: 7.2 THOU/MM3 (ref 4.8–10.8)

## 2023-01-11 PROCEDURE — 82784 ASSAY IGA/IGD/IGG/IGM EACH: CPT

## 2023-01-11 PROCEDURE — 82232 ASSAY OF BETA-2 PROTEIN: CPT

## 2023-01-11 PROCEDURE — 1036F TOBACCO NON-USER: CPT | Performed by: INTERNAL MEDICINE

## 2023-01-11 PROCEDURE — 99211 OFF/OP EST MAY X REQ PHY/QHP: CPT

## 2023-01-11 PROCEDURE — 36415 COLL VENOUS BLD VENIPUNCTURE: CPT

## 2023-01-11 PROCEDURE — 85025 COMPLETE CBC W/AUTO DIFF WBC: CPT

## 2023-01-11 PROCEDURE — 86335 IMMUNFIX E-PHORSIS/URINE/CSF: CPT

## 2023-01-11 PROCEDURE — 82330 ASSAY OF CALCIUM: CPT

## 2023-01-11 PROCEDURE — 84165 PROTEIN E-PHORESIS SERUM: CPT

## 2023-01-11 PROCEDURE — G8484 FLU IMMUNIZE NO ADMIN: HCPCS | Performed by: INTERNAL MEDICINE

## 2023-01-11 PROCEDURE — 83883 ASSAY NEPHELOMETRY NOT SPEC: CPT

## 2023-01-11 PROCEDURE — 99213 OFFICE O/P EST LOW 20 MIN: CPT | Performed by: INTERNAL MEDICINE

## 2023-01-11 PROCEDURE — 84156 ASSAY OF PROTEIN URINE: CPT

## 2023-01-11 PROCEDURE — 84155 ASSAY OF PROTEIN SERUM: CPT

## 2023-01-11 PROCEDURE — G8427 DOCREV CUR MEDS BY ELIG CLIN: HCPCS | Performed by: INTERNAL MEDICINE

## 2023-01-11 PROCEDURE — 80053 COMPREHEN METABOLIC PANEL: CPT

## 2023-01-11 PROCEDURE — G8417 CALC BMI ABV UP PARAM F/U: HCPCS | Performed by: INTERNAL MEDICINE

## 2023-01-11 PROCEDURE — 83615 LACTATE (LD) (LDH) ENZYME: CPT

## 2023-01-11 NOTE — PATIENT INSTRUCTIONS
Chest CT without contrast  Complete set of myeloma labs ordered to be done today. Follow-up with me in 3 weeks.

## 2023-01-11 NOTE — PROGRESS NOTES
[address] [facility] [address] [place] [address]  Dept: [phone]  Loc: [phone]   Hematology/Oncology Progress Note (Clinic)        Beulah Hanley  1975    1/10/2023     No ref. 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 -Myeloma bone and skeletal survey 10/20/2022-unremarkable with no lytic lesions identified    Comorbidities:  None      Subjective:   Patient is here for routine follow-up with no new complaints. Denies any bone or back pain. No fatigue. No increased frequency or severity of infections. ROS:  Review of Systems 14 point negative except as above.     PMH:   Past Medical History:   Diagnosis Date    Smoldering multiple myeloma 10/01/2016    Multiple Myeloma         Social HX:   Social History     Socioeconomic History    Marital status:      Spouse name: Lianne Sood    Number of children: 2    Years of education: 16    Highest education level: Not on file   Occupational History    Occupation: Dental Tech     Comment: [employer]   Tobacco Use    Smoking status: Former     Packs/day: 0.50     Years: 10.00     Pack years: 5.00     Types: Cigarettes     Quit date: 1/1/2008     Years since quitting: 15.0    Smokeless tobacco: Never   Substance and Sexual Activity    Alcohol use: Yes     Comment: occasionally    Drug use: No    Sexual activity: Yes     Partners: Female   Other Topics Concern    Not on file   Social History Narrative    Not on file     Social Determinants of Health     Financial Resource Strain: Low Risk     Difficulty of Paying Living Expenses: Not hard at all   Food Insecurity: No Food Insecurity    Worried About Running Out of Food in the Last Year: Never true    Ran Out of Food in the Last Year: Never true   Transportation Needs: Not on file   Physical Activity: Not on file   Stress: Not on file   Social Connections: Not on file   Intimate Partner Violence: Not on file   Housing Stability: Not on file        SpouseDannie Renee    Phone: [phone] [id number] [facility]  [address] [place] [address]     Employment:  Dental Lab owner    Immunizations:  Immunization History   Administered Date(s) Administered    COVID-19, MODERNA BLUE border, Primary or Immunocompromised, (age 12y+), IM, 100 mcg/0.5mL 02/19/2021, 03/19/2021, 09/24/2021    Hepatitis A 11/17/2016, 05/19/2017    Hepatitis B (Recombivax HB) 11/17/2016, 01/20/2017, 05/19/2017    Influenza Virus Vaccine 11/01/2017, 10/18/2020, 09/24/2021    Influenza, AFLURIA (age 1 yrs+), FLUZONE, (age 10 mo+), MDV, 0.5mL 09/20/2018, 01/03/2020    Influenza, FLUARIX, FLULAVAL, FLUZONE (age 10 mo+) AND AFLURIA, (age 1 y+), PF, 0.5mL 09/14/2016, 10/18/2020, 09/24/2021    Pneumococcal Conjugate 13-valent ([id number]) 11/17/2016    Pneumococcal Polysaccharide ([id number]) 11/17/2017    Tdap (Boostrix, Adacel) 09/14/2016        Health Screenings:    C-Scope: 2022.   Prostate:         Health Maintenance Due   Topic Date Due    Shingles vaccine (1 of 2) Never done    COVID-19 Vaccine (4 - Booster for Moderna series) 11/19/2021    Pneumococcal 0-64 years Vaccine (3 - PPSV23 if available, else PCV20) 11/17/2022        Interests:   biking    Fam HX:   Family History   Problem Relation Age of Onset    High Blood Pressure Mother     Other Mother         osteoporosis    Asthma Father     High Blood Pressure Father         Hospitalizations:   None recent    Allergies:  No Known Allergies     Adult Illness:  Patient Active Problem List   Diagnosis    Smoldering multiple myeloma        Surgery:  Past Surgical History:   Procedure Laterality Date    BONE MARROW BIOPSY  09/23/2016    COLONOSCOPY  2022    [facility] Medications:  Current Outpatient Medications   Medication Sig Dispense Refill    clobetasol (TEMOVATE) 0.05 % ointment Apply topically 2 times daily. 60 g 0    vitamin D (CHOLECALCIFEROL) 50 MCG ( UT) TABS tablet Take 1 tablet by mouth daily 100 tablet 3     No current facility-administered medications for this visit. EXAM:   vitals were not taken for this visit. Estimated body surface area is 1.97 meters squared as calculated from the following:    Height as of 22: 5' 3\" (1.6 m). Weight as of 22: 192 lb 12.8 oz (87.5 kg). ECO    General: Non-ill appearing. Appears a stated age. Pleasant comfortable in no distress. HEENT: NC/AT,nonicteric,   Neck: normal thyroid, no masses  Nodes: No adenopathy  Lungs/chest: clear, no rales,rhonchi or wheezing, lung bases clear  CV: rrr, no rubs ,gallops or murmurs  Breasts: Not examined  Abd/Rectal: soft, non-tender,bowel sounds normal , no HSM,no masses  Back: normal curvature, No midline tenderness. : Not Examined  Extremities: no cyanosis,clubbing or edema. Skin: unremarkable  Neuro: A and O x 4, CN exam nonfocal, Motor- no deficits, Sensory- no deficits, gait-nl, speech- fluent, no ataxia.   Devices: none    DATA:  LAB:     CBC with Differential:      Lab Results   Component Value Date/Time    WBC 6.3 2022 12:06 PM    RBC 5.21 2022 12:06 PM    HGB 14.4 2022 12:06 PM    HCT 46.2 2022 12:06 PM     2022 12:06 PM    MCV 89 2022 12:06 PM    MCH 27.6 2022 12:06 PM    MCHC 31.2 2022 12:06 PM    RDW 13.9 2022 12:06 PM    NRBC 0 2022 08:23 AM    SEGSPCT 50.1 2022 08:23 AM    MONOPCT 13.5 2022 08:23 AM    MONOSABS 0.5 2022 12:06 PM    LYMPHSABS 2.6 2022 12:06 PM    EOSABS 0.3 2022 12:06 PM    BASOSABS 0.0 2022 12:06 PM      Lab Results   Component Value Date/Time    SEGSABS 3.0 2022 12:06 PM       CMP:    Lab Results   Component Value Date/Time     09/22/2022 12:05 PM     05/12/2022 08:23 AM    K 4.1 09/22/2022 12:05 PM    K 4.9 05/12/2022 08:23 AM     05/12/2022 08:23 AM    CO2 23 05/12/2022 08:23 AM    BUN 15 05/12/2022 08:23 AM    CREATININE 0.8 09/22/2022 12:05 PM    CREATININE 0.7 05/12/2022 08:23 AM    LABGLOM >90 05/12/2022 08:23 AM    GLUCOSE 84 05/12/2022 08:23 AM    PROT 10.1 09/22/2022 12:05 PM    LABALBU 4.2 09/22/2022 12:05 PM    CALCIUM 8.7 05/12/2022 08:23 AM    BILITOT 0.3 09/22/2022 12:05 PM    ALKPHOS 67 09/22/2022 12:05 PM    AST 32 09/22/2022 12:05 PM    ALT 32 09/22/2022 12:05 PM       BMP:    Lab Results   Component Value Date/Time     09/22/2022 12:05 PM     05/12/2022 08:23 AM    K 4.1 09/22/2022 12:05 PM    K 4.9 05/12/2022 08:23 AM     05/12/2022 08:23 AM    CO2 23 05/12/2022 08:23 AM    BUN 15 05/12/2022 08:23 AM    LABALBU 4.2 09/22/2022 12:05 PM    CREATININE 0.8 09/22/2022 12:05 PM    CREATININE 0.7 05/12/2022 08:23 AM    CALCIUM 8.7 05/12/2022 08:23 AM    LABGLOM >90 05/12/2022 08:23 AM    GLUCOSE 84 05/12/2022 08:23 AM       Magnesium:  No results found for: MG  PT/INR:    Lab Results   Component Value Date/Time    INR 1.07 04/09/2021 11:46 AM     TSH:    Lab Results   Component Value Date/Time    TSH 1.950 02/15/2021 06:33 AM     9/22/2022  Initial visit with me. Complete set of myeloma parameters drawn. SPEP and immunofixation ordered ,although I do not see results. Serum kappa chains 59.26, lambda 7.58, ratio 7.82  IgG = 4506, IgM =85, IgA = 83  Creatinine 1.1, ionized calcium 1.19  Hemoglobin 13.2  Urine is negative for monoclonal free light chains. Urine immunofixation shows a faint band in IgG kappa. IMAGING:  -Myeloma skeletal survey 10/20/2022-unremarkable with no lytic lesions    -Bone scan 9/15/2020-no metastatic bony lesions. -CT abdomen 4/22/22-6 mm nodule left lower lobe. Uncomplicated colonic diverticulosis.     PROCEDURES:  Bone marrow biopsy 2016    PATHOLOGY:   Bone marrow biopsy 2016 showed 17% plasma cells. FISH resulted-normal.    GENETICS:  Normal marrow. MOLECULAR:  FISH panel normal for myeloma in 2016. ASSESSMENT/PLAN:    1: Diagnosis: Very pleasant 77-year-old gentleman diagnosed in June 2016 with smoldering myeloma. Currently no evidence of endorgan damage i.e. no myeloma. First appointment with me today we will reevaluate complete myeloma parameters and repeat skeletal survey. He is asymptomatic. 2) Prognosis / Disease Status: Observe closely for progression to myeloma    3) Work-up:    Labs: 9/22/2022 CBC, CMP, SPEP, UPEP, quantitative globulins, beta-2 microglobulin and serum light chains. Summarized above. Patient did not get labs in December as requested. We will draw today. Imaging: Skeletal bone survey   Procedures:   Consults:   Other: Note patient had a chest CT 5/12/2022;     Impression   Multiple bilateral subcentimeter pulmonary nodules largest is the 6 mm left lower lobe nodule. Consider follow-up CT chest in 3-6 months then at 18 and 24 months as the patient is high risk per Fleischner Society recommendations. Therefore, if not already done he will need a chest CT. 4) Symptom Management: He is asymptomatic      5) Supportive care provided. Level of care is appropriate. Teaching done today. Reviewed the criteria for myeloma versus multiple myeloma. Reviewed endorgan damage criteria. 6) Treatment goal:      Treatment plan:      Continue surveillance. 7) Medications reviewed. Prescriptions today:            No orders of the defined types were placed in this encounter. [place]ARRS:  Controlled Substance Monitoring:    Acute and Chronic Pain Monitoring:   No flowsheet data found.        8) Research Options:       None      9) Other:        None    10) Follow Up:  -Chest CT without contrast ordered to compare to May of 2022 CAT scan that showed lung nodules that require follow-up. -Order complete set of myeloma labs. Labs in September showed an alarming increase in IgG although no endorgan damage.  -May benefit from PET/CT to rule out occult bone mets possibly from bone marrow biopsy given the steady rise of IgG. First await results of today's labs  -Ask lab to look for the results of the SPEP that should have been done in September.   Return for follow-up with me in 3 months to review the CT and lab results for    Chanel Raymond MD

## 2023-01-12 LAB
BETA-2 MICROGLOBULIN: 1.6 MG/L (ref 0.6–2.4)
IGA: 75 MG/DL (ref 70–400)
IGG: 4933 MG/DL (ref 700–1600)
IGM: 90 MG/DL (ref 40–230)

## 2023-01-13 LAB
KAPPA/LAMBDA FREE LIGHT CHAINS: NORMAL
PROTEIN ELECTROPHORESIS, URINE: NORMAL

## 2023-01-14 LAB
IGA: 86 MG/DL (ref 68–408)
MISC. #1 REFERENCE GROUP TEST: ABNORMAL

## 2023-01-26 ENCOUNTER — HOSPITAL ENCOUNTER (OUTPATIENT)
Dept: CT IMAGING | Age: 48
Discharge: HOME | End: 2023-01-26
Payer: COMMERCIAL

## 2023-01-26 DIAGNOSIS — D47.2 SMOLDERING MYELOMA: ICD-10-CM

## 2023-01-26 DIAGNOSIS — R91.8 LUNG NODULES: ICD-10-CM

## 2023-01-26 PROCEDURE — 71250 CT THORAX DX C-: CPT

## 2023-01-31 NOTE — PROGRESS NOTES
[address] CANCER [address] [place] [address]  Dept: [phone]  Loc: [phone]   Hematology/Oncology Progress Note (Clinic)        Landen Calero  1975 1/31/2023     No ref. 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 -Myeloma bone and skeletal survey 10/20/2022-unremarkable with no lytic lesions identified    Comorbidities:  None      Subjective:   Patient is here for routine follow-up with no new complaints. Denies any bone or back pain. No fatigue. No increased frequency or severity of infections. Here for recent follow-up chest CT compared to May that shows no increase in the small nonspecific pulmonary nodules. Patient noted and understands and he will get another chest CT in 1 year. ROS:  Review of Systems 14 point negative except as above.     PMH:   Past Medical History:   Diagnosis Date    Smoldering multiple myeloma 10/01/2016    Multiple Myeloma         Social HX:   Social History     Socioeconomic History    Marital status:      Spouse name: Jacey Shen    Number of children: 2    Years of education: 16    Highest education level: Not on file   Occupational History    Occupation: Dental Tech     Comment: [employer]   Tobacco Use    Smoking status: Former     Packs/day: 0.50     Years: 10.00     Pack years: 5.00     Types: Cigarettes     Quit date: 1/1/2008     Years since quitting: 15.0    Smokeless tobacco: Never   Substance and Sexual Activity    Alcohol use: Yes     Comment: occasionally    Drug use: No    Sexual activity: Yes     Partners: Female   Other Topics Concern    Not on file   Social History Narrative    Not on file     Social Determinants of Health     Financial Resource Strain: Low Risk     Difficulty of Paying Living Expenses: Not hard at all   Food Insecurity: No Food Insecurity    Worried About Running Out of Food in the Last Year: Never true    Ran Out of Food in the Last Year: Never true   Transportation Needs: Not on file   Physical Activity: Not on file   Stress: Not on file   Social Connections: Not on file   Intimate Partner Violence: Not on file   Housing Stability: Not on file        SpouseJurline Listen    Phone: [phone] [address]     Employment:  Dental Lab owner    Immunizations:  Immunization History   Administered Date(s) Administered    COVID-19, MODERNA BLUE border, Primary or Immunocompromised, (age 12y+), IM, 100 mcg/0.5mL 02/19/2021, 03/19/2021, 09/24/2021    Hepatitis A 11/17/2016, 05/19/2017    Hepatitis B (Recombivax HB) 11/17/2016, 01/20/2017, 05/19/2017    Influenza Virus Vaccine 11/01/2017, 10/18/2020, 09/24/2021    Influenza, AFLURIA (age 1 yrs+), FLUZONE, (age 10 mo+), MDV, 0.5mL 09/20/2018, 01/03/2020    Influenza, FLUARIX, FLULAVAL, FLUZONE (age 10 mo+) AND AFLURIA, (age 1 y+), PF, 0.5mL 09/14/2016, 10/18/2020, 09/24/2021    Pneumococcal Conjugate 13-valent ([id number]) 11/17/2016    Pneumococcal Polysaccharide ([id number]) 11/17/2017    Tdap (Boostrix, Adacel) 09/14/2016        Health Screenings:    C-Scope: 2022.   Prostate:         Health Maintenance Due   Topic Date Due    Shingles vaccine (1 of 2) Never done    COVID-19 Vaccine (4 - Booster for Moderna series) 11/19/2021    Pneumococcal 0-64 years Vaccine (3 - PPSV23 if available, else PCV20) 11/17/2022        Interests:   biking    Fam HX:   Family History   Problem Relation Age of Onset    High Blood Pressure Mother     Other Mother         osteoporosis    Asthma Father     High Blood Pressure Father         Hospitalizations:   None recent    Allergies:  No Known Allergies     Adult Illness:  Patient Active Problem List   Diagnosis    Smoldering multiple myeloma        Surgery:  Past Surgical History:   Procedure Laterality Date    BONE MARROW BIOPSY  2016    COL[facility]COPY      [facility]        Medications:  Current Outpatient Medications   Medication Sig Dispense Refill    clobetasol (TEMOVATE) 0.05 % ointment Apply topically 2 times daily. (Patient not taking: Reported on 2023) 60 g 0    vitamin D (CHOLECALCIFEROL) 50 MCG (2000 UT) TABS tablet Take 1 tablet by mouth daily (Patient not taking: Reported on 2023) 100 tablet 3     No current facility-administered medications for this visit. EXAM:   vitals were not taken for this visit. Estimated body surface area is 1.98 meters squared as calculated from the following:    Height as of 23: 5' 3\" (1.6 m). Weight as of 23: 195 lb (88.5 kg). ECO    General: Non-ill appearing. Appears a stated age. Pleasant comfortable in no distress. Exam today 2023 limited to palpation of his back humeri and femurs that revealed no pain. HEENT: NC/AT,nonicteric,   Neck: normal thyroid, no masses  Nodes: No adenopathy  Lungs/chest: clear, no rales,rhonchi or wheezing, lung bases clear  CV: rrr, no rubs ,gallops or murmurs  Breasts: Not examined  Abd/Rectal: soft, non-tender,bowel sounds normal , no HSM,no masses  Back: normal curvature, No midline tenderness. : Not Examined  Extremities: no cyanosis,clubbing or edema. Skin: unremarkable  Neuro: A and O x 4, CN exam nonfocal, Motor- no deficits, Sensory- no deficits, gait-nl, speech- fluent, no ataxia.   Devices: none    DATA:  LAB:     CBC with Differential:      Lab Results   Component Value Date/Time    WBC 7.2 2023 11:59 AM    RBC 5.16 2023 11:59 AM    HGB 14.3 2023 11:59 AM    HCT 45.8 2023 11:59 AM     2023 11:59 AM    MCV 89 2023 11:59 AM    MCH 27.7 01/11/2023 11:59 AM    MCHC 31.2 01/11/2023 11:59 AM    RDW 13.8 01/11/2023 11:59 AM    NRBC 0 05/12/2022 08:23 AM    SEGSPCT 50.1 05/12/2022 08:23 AM    MONOPCT 13.5 05/12/2022 08:23 AM    MONOSABS 0.8 01/11/2023 11:59 AM    LYMPHSABS 2.9 01/11/2023 11:59 AM    EOSABS 0.4 01/11/2023 11:59 AM    BASOSABS 0.1 01/11/2023 11:59 AM      Lab Results   Component Value Date/Time    SEGSABS 3.0 01/11/2023 11:59 AM       CMP:    Lab Results   Component Value Date/Time     01/11/2023 11:58 AM    K 3.9 01/11/2023 11:58 AM     01/11/2023 11:58 AM    CO2 24 01/11/2023 11:58 AM    BUN 13 01/11/2023 11:58 AM    CREATININE 0.8 01/11/2023 11:58 AM    LABGLOM >60 01/11/2023 11:58 AM    GLUCOSE 78 01/11/2023 11:58 AM    PROT 9.9 01/11/2023 11:58 AM    LABALBU 4.0 01/11/2023 11:58 AM    CALCIUM 8.9 01/11/2023 11:58 AM    BILITOT 0.2 01/11/2023 11:58 AM    ALKPHOS 64 01/11/2023 11:58 AM    AST 27 01/11/2023 11:58 AM    ALT 38 01/11/2023 11:58 AM       BMP:    Lab Results   Component Value Date/Time     01/11/2023 11:58 AM    K 3.9 01/11/2023 11:58 AM     01/11/2023 11:58 AM    CO2 24 01/11/2023 11:58 AM    BUN 13 01/11/2023 11:58 AM    LABALBU 4.0 01/11/2023 11:58 AM    CREATININE 0.8 01/11/2023 11:58 AM    CALCIUM 8.9 01/11/2023 11:58 AM    LABGLOM >60 01/11/2023 11:58 AM    GLUCOSE 78 01/11/2023 11:58 AM       Magnesium:  No results found for: MG  PT/INR:    Lab Results   Component Value Date/Time    INR 1.07 04/09/2021 11:46 AM     TSH:    Lab Results   Component Value Date/Time    TSH 1.950 02/15/2021 06:33 AM     1/11/23  SPEP-monoclonal spike in Gamma , 3.63 with 3.34 being mcp in g/dl.  IgG kappa  IgG= 4933   IGA - 75/normal  S fklc-72, S fllc- 6.93,  ratio= 10.34  LDH- 159/ normal  Hgb- 14.4  Creat- .8  ,  calcium- 8.9  B2 mg- 1.6  UPEP-suapicious band in gamma.    9/22/2022  Initial visit with me.  Complete set of myeloma parameters drawn.  SPEP and immunofixation . MCP= 3.35.   Serum kappa chains 59.26,  lambda 7.58, ratio 7.82  IgG = 4506, IgM =85, IgA = 83  Creatinine 1.1, ionized calcium 1.19  Hemoglobin 13.2  Urine is negative for monoclonal free light chains. Urine immunofixation shows a faint band in IgG kappa. IMAGING:  -Myeloma skeletal survey 10/20/2022-unremarkable with no lytic lesions    -Bone scan 9/15/2020-no metastatic bony lesions. -CT abdomen 4/22/22-6 mm nodule left lower lobe. Uncomplicated colonic diverticulosis. -Chest CT wo contrast 1/26/23  COMPARISON: CT chest 5/12/2022. TECHNIQUE:    5 mm axial imaging through the chest without contrast. Coronal and sagittal reconstructions were performed. All CT scans at this facility use dose modulation, iterative reconstruction, and/or weight based dosing when appropriate to reduce the radiation dose to as low as reasonably achievable. FINDINGS:   Heart/mediastinum: The thyroid gland is unremarkable. The heart size is normal. No pericardial effusion is observed. The aorta is not dilated. No mediastinal, hilar, or axillary lymphadenopathy is visualized accounting for lack of IV contrast.       Lungs: Small scattered 3 to 4 mm pulmonary nodules are stable. For example, a 3 mm left upper lobe pulmonary nodule is stable (series 2, image 21). 4 mm nodules in the right lung (series 2, image 23, image 24, and image 37) are unchanged. A 5-6 mm left    lower lobe pulmonary nodule is stable (series 2, image 38). No new pulmonary mass or nodule is identified. No focal consolidation, pleural effusion, or pneumothorax is observed. Dependent atelectasis is present. Upper abdomen: No acute findings are noted in the limited images through the upper abdomen. Musculoskeletal: The visualized skeletal structures appear intact. Impression   Stable small bilateral pulmonary nodules. No new or suspicious pulmonary mass or nodule observed. No acute intrathoracic process visualized.        A one-year follow-up noncontrast CT thorax may be obtained to confirm stability. PROCEDURES:  Bone marrow biopsy 2016    PATHOLOGY:   Bone marrow biopsy 2016 showed 17% plasma cells. FISH resulted-normal.    GENETICS:  Normal marrow. MOLECULAR:  FISH panel normal for myeloma in 2016. ASSESSMENT/PLAN:    1: Diagnosis: Very pleasant 27-year-old gentleman diagnosed in June 2016 with smoldering myeloma. Currently no evidence of endorgan damage i.e. no myeloma. First appointment with me today we will reevaluate complete myeloma parameters and repeat skeletal survey. He is asymptomatic. Recent evaluation- no signs of end organ damage. 2) Prognosis / Disease Status: Observe closely for progression to myeloma    3) Work-up:    Labs: Labs summarized above from January 2023; No Significant change. Imaging: Skeletal bone survey last done 10/27/22- no lytic lesions. Procedures:   Consults:   Other: Note patient had a chest CT 5/12/2022;     Impression   Multiple bilateral subcentimeter pulmonary nodules largest is the 6 mm left lower lobe nodule. Consider follow-up CT chest in 3-6 months then at 18 and 24 months as the patient is high risk per Fleischner Society recommendations. Therefore, the 1/26/23 chest CT done and remains stable. Next CCT in 1 year. 4) Symptom Management: He is asymptomatic      5) Supportive care provided. Level of care is appropriate. Teaching done today. Reviewed the criteria for myeloma versus multiple myeloma. Reviewed endorgan damage criteria. 6) Treatment goal:      Treatment plan:      Continue surveillance. 7) Medications reviewed. Prescriptions today:            No orders of the defined types were placed in this encounter. [place]:  Controlled Substance Monitoring:    Acute and Chronic Pain Monitoring:   No flowsheet data found. 8) Research Options:       None      9) Other:        None    10) Follow Up:   Follow-up 6 months with me in 2 weeks before check myeloma parameters; ordered. Next chest CT will be in January 2024.     Anita Christianson MD

## 2023-02-01 ENCOUNTER — HOSPITAL ENCOUNTER (OUTPATIENT)
Dept: INFUSION THERAPY | Age: 48
Discharge: HOME | End: 2023-02-01
Payer: COMMERCIAL

## 2023-02-01 ENCOUNTER — OFFICE VISIT (OUTPATIENT)
Dept: ONCOLOGY | Age: 48
End: 2023-02-01
Payer: COMMERCIAL

## 2023-02-01 VITALS
OXYGEN SATURATION: 99 % | HEART RATE: 74 BPM | HEIGHT: 63 IN | BODY MASS INDEX: 34.73 KG/M2 | DIASTOLIC BLOOD PRESSURE: 76 MMHG | SYSTOLIC BLOOD PRESSURE: 110 MMHG | TEMPERATURE: 97.7 F | RESPIRATION RATE: 16 BRPM | WEIGHT: 196 LBS

## 2023-02-01 VITALS
SYSTOLIC BLOOD PRESSURE: 110 MMHG | DIASTOLIC BLOOD PRESSURE: 76 MMHG | RESPIRATION RATE: 16 BRPM | HEART RATE: 74 BPM | TEMPERATURE: 97.7 F

## 2023-02-01 DIAGNOSIS — D47.2 SMOLDERING MYELOMA: Primary | ICD-10-CM

## 2023-02-01 PROCEDURE — G8427 DOCREV CUR MEDS BY ELIG CLIN: HCPCS | Performed by: INTERNAL MEDICINE

## 2023-02-01 PROCEDURE — 1036F TOBACCO NON-USER: CPT | Performed by: INTERNAL MEDICINE

## 2023-02-01 PROCEDURE — G8417 CALC BMI ABV UP PARAM F/U: HCPCS | Performed by: INTERNAL MEDICINE

## 2023-02-01 PROCEDURE — 99213 OFFICE O/P EST LOW 20 MIN: CPT | Performed by: INTERNAL MEDICINE

## 2023-02-01 PROCEDURE — G8484 FLU IMMUNIZE NO ADMIN: HCPCS | Performed by: INTERNAL MEDICINE

## 2023-02-01 PROCEDURE — 99211 OFF/OP EST MAY X REQ PHY/QHP: CPT

## 2023-02-01 NOTE — PATIENT INSTRUCTIONS
Follow-up with me in 6 months. 2 Weeks before that appointment patient will have myeloma labs drawn. Ordered.